# Patient Record
Sex: MALE | Race: WHITE | NOT HISPANIC OR LATINO | Employment: OTHER | URBAN - METROPOLITAN AREA
[De-identification: names, ages, dates, MRNs, and addresses within clinical notes are randomized per-mention and may not be internally consistent; named-entity substitution may affect disease eponyms.]

---

## 2017-03-20 ENCOUNTER — OFFICE VISIT (OUTPATIENT)
Dept: FAMILY MEDICINE | Facility: CLINIC | Age: 82
End: 2017-03-20
Payer: COMMERCIAL

## 2017-03-20 VITALS
WEIGHT: 191 LBS | TEMPERATURE: 97.3 F | DIASTOLIC BLOOD PRESSURE: 71 MMHG | SYSTOLIC BLOOD PRESSURE: 130 MMHG | RESPIRATION RATE: 18 BRPM | BODY MASS INDEX: 28.95 KG/M2 | HEIGHT: 68 IN

## 2017-03-20 DIAGNOSIS — E78.2 MIXED HYPERLIPIDEMIA: ICD-10-CM

## 2017-03-20 DIAGNOSIS — I10 HYPERTENSION GOAL BP (BLOOD PRESSURE) < 140/90: Primary | ICD-10-CM

## 2017-03-20 DIAGNOSIS — Z00.00 ROUTINE HISTORY AND PHYSICAL EXAMINATION OF ADULT: ICD-10-CM

## 2017-03-20 LAB
ANION GAP SERPL CALCULATED.3IONS-SCNC: 9 MMOL/L (ref 3–14)
BUN SERPL-MCNC: 12 MG/DL (ref 7–30)
CALCIUM SERPL-MCNC: 9.2 MG/DL (ref 8.5–10.1)
CHLORIDE SERPL-SCNC: 100 MMOL/L (ref 94–109)
CHOLEST SERPL-MCNC: 161 MG/DL
CO2 SERPL-SCNC: 27 MMOL/L (ref 20–32)
CREAT SERPL-MCNC: 1.32 MG/DL (ref 0.66–1.25)
GFR SERPL CREATININE-BSD FRML MDRD: 51 ML/MIN/1.7M2
GLUCOSE SERPL-MCNC: 105 MG/DL (ref 70–99)
HDLC SERPL-MCNC: 59 MG/DL
LDLC SERPL CALC-MCNC: 77 MG/DL
NONHDLC SERPL-MCNC: 102 MG/DL
POTASSIUM SERPL-SCNC: 3.8 MMOL/L (ref 3.4–5.3)
SODIUM SERPL-SCNC: 136 MMOL/L (ref 133–144)
TRIGL SERPL-MCNC: 124 MG/DL

## 2017-03-20 PROCEDURE — 80048 BASIC METABOLIC PNL TOTAL CA: CPT | Performed by: FAMILY MEDICINE

## 2017-03-20 PROCEDURE — 80061 LIPID PANEL: CPT | Performed by: FAMILY MEDICINE

## 2017-03-20 PROCEDURE — 36415 COLL VENOUS BLD VENIPUNCTURE: CPT | Performed by: FAMILY MEDICINE

## 2017-03-20 PROCEDURE — G0438 PPPS, INITIAL VISIT: HCPCS | Performed by: FAMILY MEDICINE

## 2017-03-20 RX ORDER — SIMVASTATIN 20 MG
20 TABLET ORAL AT BEDTIME
Qty: 90 TABLET | Refills: 3 | Status: SHIPPED | OUTPATIENT
Start: 2017-03-20 | End: 2018-03-23

## 2017-03-20 RX ORDER — LISINOPRIL AND HYDROCHLOROTHIAZIDE 12.5; 2 MG/1; MG/1
1 TABLET ORAL DAILY
Qty: 90 TABLET | Refills: 3 | Status: SHIPPED | OUTPATIENT
Start: 2017-03-20 | End: 2018-03-23

## 2017-03-20 NOTE — NURSING NOTE
"Chief Complaint   Patient presents with     Physical       Initial /71  Temp 97.3  F (36.3  C)  Resp 18  Ht 5' 8\" (1.727 m)  Wt 191 lb (86.6 kg)  BMI 29.04 kg/m2 Estimated body mass index is 29.04 kg/(m^2) as calculated from the following:    Height as of this encounter: 5' 8\" (1.727 m).    Weight as of this encounter: 191 lb (86.6 kg).  Medication Reconciliation: complete   Sayra Millard CMA      "

## 2017-03-20 NOTE — PROGRESS NOTES
SUBJECTIVE:                                                            Shawn Carty is a 89 year old male who presents for Preventive Visit.      Are you in the first 12 months of your Medicare Part B coverage?  No    Healthy Habits:    Do you get at least three servings of calcium containing foods daily (dairy, green leafy vegetables, etc.)? yes    Amount of exercise or daily activities, outside of work: 7 day(s) per week    Problems taking medications regularly No    Medication side effects: No    Have you had an eye exam in the past two years? no    Do you see a dentist twice per year? yes    Do you have sleep apnea, excessive snoring or daytime drowsiness?no    COGNITIVE SCREEN  1) Repeat 3 items (Banana, Sunrise, Chair)    2) Clock draw: NORMAL  3) 3 item recall: Recalls 3 objects  Results: 3 items recalled: COGNITIVE IMPAIRMENT LESS LIKELY    Mini-CogTM Copyright S Preeti. Licensed by the author for use in [facility]; reprinted with permission (suzi@[facility]). All rights reserved.                Reviewed and updated as needed this visit by clinical staff  Tobacco  Allergies  Meds         Reviewed and updated as needed this visit by Provider        Social History   Substance Use Topics     Smoking status: Former Smoker     Smokeless tobacco: Never Used     Alcohol use No       The patient does not drink >3 drinks per day nor >7 drinks per week.    Today's PHQ-2 Score:   PHQ-2 ( 1999 Pfizer) 3/20/2017 3/2/2016   Q1: Little interest or pleasure in doing things 0 0   Q2: Feeling down, depressed or hopeless 0 0   PHQ-2 Score 0 0       Do you feel safe in your environment - Yes    Do you have a Health Care Directive?: Yes: Advance Directive has been received and scanned.    Current providers sharing in care for this patient include:   Patient Care Team:  Cesar Manuel MD as PCP - General (Family Practice)      Hearing impairment: No    Ability to successfully perform activities of daily  "living: Yes, no assistance needed     Fall risk:  Fallen 2 or more times in the past year?: No  Any fall with injury in the past year?: No    Home safety:  none identified      The following health maintenance items are reviewed in Epic and correct as of today:  Health Maintenance   Topic Date Due     TETANUS IMMUNIZATION (SYSTEM ASSIGNED)  03/05/1946     ADVANCE DIRECTIVE PLANNING Q5 YRS (NO INBASKET)  03/05/1946     PNEUMOCOCCAL (1 of 2 - PCV13) 03/05/1993     FALL RISK ASSESSMENT  03/02/2017     INFLUENZA VACCINE (SYSTEM ASSIGNED)  09/01/2017              ROS:  Constitutional, HEENT, cardiovascular, pulmonary, GI, , musculoskeletal, neuro, skin, endocrine and psych systems are negative, except as otherwise noted.    Problem list, Medication list, Allergies, and Medical/Social/Surgical histories reviewed in [facility] and updated as appropriate.  OBJECTIVE:                                                            /71  Temp 97.3  F (36.3  C)  Resp 18  Ht 5' 8\" (1.727 m)  Wt 191 lb (86.6 kg)  BMI 29.04 kg/m2 Estimated body mass index is 29.04 kg/(m^2) as calculated from the following:    Height as of this encounter: 5' 8\" (1.727 m).    Weight as of this encounter: 191 lb (86.6 kg).  EXAM:   GENERAL: healthy, alert and no distress  EYES: Eyes grossly normal to inspection, PERRL and conjunctivae and sclerae normal  HENT: ear canals and TM's normal, nose and mouth without ulcers or lesions  NECK: no adenopathy, no asymmetry, masses, or scars and thyroid normal to palpation  RESP: lungs clear to auscultation - no rales, rhonchi or wheezes  CV: regular rate and rhythm, normal S1 S2, no S3 or S4, no murmur, click or rub, no peripheral edema and peripheral pulses strong  ABDOMEN: soft, nontender, no hepatosplenomegaly, no masses and bowel sounds normal  MS: no gross musculoskeletal defects noted, no edema  SKIN: no suspicious lesions or rashes  NEURO: Normal strength and tone, mentation intact and speech normal  PSYCH: " "mentation appears normal, affect normal/bright    ASSESSMENT / PLAN:                                                            Shawn was seen today for physical.    Diagnoses and all orders for this visit:    Hypertension goal BP (blood pressure) < 140/90  -     Basic metabolic panel    Mixed hyperlipidemia  -     Lipid panel reflex to direct LDL  -     lisinopril-hydrochlorothiazide (PRINZIDE/ZESTORETIC) 20-12.5 MG per tablet; Take 1 tablet by mouth daily  -     simvastatin (ZOCOR) 20 MG tablet; Take 1 tablet (20 mg) by mouth At Bedtime    Routine history and physical examination of adult        End of Life Planning:  Patient currently has an advanced directive:     COUNSELING:  Reviewed preventive health counseling, as reflected in patient instructions       Regular exercise       Healthy diet/nutrition        Estimated body mass index is 29.04 kg/(m^2) as calculated from the following:    Height as of this encounter: 5' 8\" (1.727 m).    Weight as of this encounter: 191 lb (86.6 kg).     reports that he has quit smoking. He has never used smokeless tobacco.      Appropriate preventive services were discussed with this patient, including applicable screening as appropriate for cardiovascular disease, diabetes, osteopenia/osteoporosis, and glaucoma.  As appropriate for age/gender, discussed screening for colorectal cancer, prostate cancer, breast cancer, and cervical cancer. Checklist reviewing preventive services available has been given to the patient.    Reviewed patients plan of care and provided an AVS. The Basic Care Plan (routine screening as documented in Health Maintenance) for Shawn meets the Care Plan requirement. This Care Plan has been established and reviewed with the Patient.    Counseling Resources:  ATP IV Guidelines  Pooled Cohorts Equation Calculator  Breast Cancer Risk Calculator  FRAX Risk Assessment  ICSI Preventive Guidelines  Dietary Guidelines for Americans, 2010  USDA's MyPlate  ASA " Prophylaxis  Lung CA Screening    Cesar Manuel MD  [facility]

## 2017-03-20 NOTE — LETTER
[facility]  [address]  [place] [address]  Phone: [phone]      3/20/2017     Shawn Carty  [address]    [place] [address]      Dear Shawn:    Thank you for allowing me to participate in your care. Your recent test results were reviewed and listed below.      Your results are provided below for your review  Results for orders placed or performed in visit on 03/20/17   Basic metabolic panel   Result Value Ref Range    Sodium 136 133 - 144 mmol/L    Potassium 3.8 3.4 - 5.3 mmol/L    Chloride 100 94 - 109 mmol/L    Carbon Dioxide 27 20 - 32 mmol/L    Anion Gap 9 3 - 14 mmol/L    Glucose 105 (H) 70 - 99 mg/dL    Urea Nitrogen 12 7 - 30 mg/dL    Creatinine 1.32 (H) 0.66 - 1.25 mg/dL    GFR Estimate 51 (L) >60 mL/min/1.7m2    GFR Estimate If Black 62 >60 mL/min/1.7m2    Calcium 9.2 8.5 - 10.1 mg/dL   Lipid panel reflex to direct LDL   Result Value Ref Range    Cholesterol 161 <200 mg/dL    Triglycerides 124 <150 mg/dL    HDL Cholesterol 59 >39 mg/dL    LDL Cholesterol Calculated 77 <100 mg/dL    Non HDL Cholesterol 102 <130 mg/dL               inform patient tests are acceptable                             Thank you for choosing [place]. As a result, please continue with the treatment plan discussed in the office. Return as discussed or sooner if symptoms worsen or fail to improve. If you have any further questions or concerns, please do not hesitate to contact us.      Sincerely,        Dr. Cesar Manuel/Sayra Millard, CMA

## 2017-03-20 NOTE — MR AVS SNAPSHOT
After Visit Summary   3/20/2017    Shawn Carty    MRN: [id number]           Patient Information     Date Of Birth          3/5/1928        Visit Information        Provider Department      3/20/2017 9:40 AM Cesar Manuel MD [facility]        Today's Diagnoses     Hypertension goal BP (blood pressure) < 140/90    -  1    Mixed hyperlipidemia        Routine history and physical examination of adult          Care Instructions      Preventive Health Recommendations:       Male Ages 65 and over    Yearly exam:             See your health care provider every year in order to  o   Review health changes.   o   Discuss preventive care.    o   Review your medicines if your doctor has prescribed any.    Talk with your health care provider about whether you should have a test to screen for prostate cancer (PSA).    Every 3 years, have a diabetes test (fasting glucose). If you are at risk for diabetes, you should have this test more often.    Every 5 years, have a cholesterol test. Have this test more often if you are at risk for high cholesterol or heart disease.     Every 10 years, have a colonoscopy. Or, have a yearly FIT test (stool test). These exams will check for colon cancer.    Talk to with your health care provider about screening for Abdominal Aortic Aneurysm if you have a family history of AAA or have a history of smoking.  Shots:     Get a flu shot each year.     Get a tetanus shot every 10 years.     Talk to your doctor about your pneumonia vaccines. There are now two you should receive - Pneumovax (PPSV 23) and Prevnar (PCV 13).    Talk to your doctor about a shingles vaccine.     Talk to your doctor about the hepatitis B vaccine.  Nutrition:     Eat at least 5 servings of fruits and vegetables each day.     Eat whole-grain bread, whole-wheat pasta and brown rice instead of white grains and rice.     Talk to your doctor about Calcium and Vitamin D.   Lifestyle    Exercise  "for at least 150 minutes a week (30 minutes a day, 5 days a week). This will help you control your weight and prevent disease.     Limit alcohol to one drink per day.     No smoking.     Wear sunscreen to prevent skin cancer.     See your dentist every six months for an exam and cleaning.     See your eye doctor every 1 to 2 years to screen for conditions such as glaucoma, macular degeneration and cataracts.        Follow-ups after your visit        Who to contact     If you have questions or need follow up information about today's clinic visit or your schedule please contact [facility] 2[facility] directly at [phone].  Normal or non-critical lab and imaging results will be communicated to you by Zeohart, letter or phone within 4 business days after the clinic has received the results. If you do not hear from us within 7 days, please contact the clinic through One2startt or phone. If you have a critical or abnormal lab result, we will notify you by phone as soon as possible.  Submit refill requests through [email or web address] or call your pharmacy and they will forward the refill request to us. Please allow 3 business days for your refill to be completed.          Additional Information About Your Visit        ZeoharAOBiome Information     [email or web address] lets you send messages to your doctor, view your test results, renew your prescriptions, schedule appointments and more. To sign up, go to [email or web address] . Click on \"Log in\" on the left side of the screen, which will take you to the Welcome page. Then click on \"Sign up Now\" on the right side of the page.     You will be asked to enter the access code listed below, as well as some personal information. Please follow the directions to create your username and password.     Your access code is: [email or web address]  Expires: 2017  9:54 AM     Your access code will  in 90 days. If you need help or a new code, please call your [facility] or [phone].        Care " "EveryWhere ID     This is your Care EveryWhere ID. This could be used by other organizations to access your [place] medical records  [id number]        Your Vitals Were     Temperature Respirations Height BMI (Body Mass Index)          97.3  F (36.3  C) 18 5' 8\" (1.727 m) 29.04 kg/m2         Blood Pressure from Last 3 Encounters:   03/20/17 130/71   11/21/16 144/76   03/02/16 138/85    Weight from Last 3 Encounters:   03/20/17 191 lb (86.6 kg)   11/21/16 195 lb 9.6 oz (88.7 kg)   03/02/16 183 lb (83 kg)              We Performed the Following     Basic metabolic panel     Lipid panel reflex to direct LDL          Today's Medication Changes          These changes are accurate as of: 3/20/17 10:19 AM.  If you have any questions, ask your nurse or doctor.               These medicines have changed or have updated prescriptions.        Dose/Directions    lisinopril-hydrochlorothiazide 20-12.5 MG per tablet   Commonly known as:  PRINZIDE/ZESTORETIC   This may have changed:  additional instructions   Used for:  Mixed hyperlipidemia   Changed by:  Cesar Manuel MD        Dose:  1 tablet   Take 1 tablet by mouth daily   Quantity:  90 tablet   Refills:  3       simvastatin 20 MG tablet   Commonly known as:  ZOCOR   This may have changed:  additional instructions   Used for:  Mixed hyperlipidemia   Changed by:  Cesar Manuel MD        Dose:  20 mg   Take 1 tablet (20 mg) by mouth At Bedtime   Quantity:  90 tablet   Refills:  3            Where to get your medicines      These medications were sent to [facility] [address]  [address], [facility] [place] [facility] [address]     Phone:  [phone]     lisinopril-hydrochlorothiazide 20-12.5 MG per tablet    simvastatin 20 MG tablet                Primary Care Provider Office Phone # Fax #    Cesar Manuel [phone]       [facility] [address] [address] [facility] [address]        Thank you!     Thank you " for choosing [facility]  for your care. Our goal is always to provide you with excellent care. Hearing back from our patients is one way we can continue to improve our services. Please take a few minutes to complete the written survey that you may receive in the mail after your visit with us. Thank you!             Your Updated Medication List - Protect others around you: Learn how to safely use, store and throw away your medicines at www.disposemymeds.org.          This list is accurate as of: 3/20/17 10:19 AM.  Always use your most recent med list.                   Brand Name Dispense Instructions for use    ALLERGY 4 HOUR PO          calcium carbonate 500 MG tablet    OS-OBED 500 mg [place]. [place]     Take 500 mg by mouth 2 times daily       cetirizine 10 MG tablet    zyrTEC     Take 10 mg by mouth daily       lisinopril-hydrochlorothiazide 20-12.5 MG per tablet    PRINZIDE/ZESTORETIC    90 tablet    Take 1 tablet by mouth daily       multivitamin, therapeutic Tabs tablet      Take 1 tablet by mouth daily       simvastatin 20 MG tablet    ZOCOR    90 tablet    Take 1 tablet (20 mg) by mouth At Bedtime       VITAMIN D3 PO      Take by mouth daily

## 2017-05-24 ENCOUNTER — OFFICE VISIT (OUTPATIENT)
Dept: FAMILY MEDICINE | Facility: CLINIC | Age: 82
End: 2017-05-24
Payer: COMMERCIAL

## 2017-05-24 VITALS
HEART RATE: 77 BPM | HEIGHT: 68 IN | DIASTOLIC BLOOD PRESSURE: 78 MMHG | TEMPERATURE: 98.7 F | WEIGHT: 197 LBS | SYSTOLIC BLOOD PRESSURE: 130 MMHG | BODY MASS INDEX: 29.86 KG/M2

## 2017-05-24 DIAGNOSIS — M54.5 LOW BACK PAIN, UNSPECIFIED BACK PAIN LATERALITY, UNSPECIFIED CHRONICITY, WITH SCIATICA PRESENCE UNSPECIFIED: Primary | ICD-10-CM

## 2017-05-24 PROCEDURE — 99213 OFFICE O/P EST LOW 20 MIN: CPT | Performed by: FAMILY MEDICINE

## 2017-05-24 NOTE — PROGRESS NOTES
SUBJECTIVE:                                                    Shawn Carty is a 89 year old male who presents to clinic today for the following health issues:    Back Pain      Duration: 2-3 weeks, started with hard to stand up, ache in lower back, radiating into calfs in both legs. Tylenol is helping. Feels as though he will fall with certain movement.         Specific cause: none    Description:   Location of pain: low back bilateral  Character of pain: sharp with certain movement  and dull ache  Pain radiation:radiates into the right leg and radiates into the left leg  New numbness or weakness in legs, not attributed to pain:  no     Intensity: At its worst 5/10    History:   History of back problems: recurrent self limited episodes of low back pain in the past, seen chiropractor.   Any previous MRI or X-rays: Yes  Sees a specialist for back pain:    Therapies tried without relief: back exercise    Alleviating factors:   Improved by: acetaminophen (Tylenol)      Precipitating factors:  Worsened by: certain movement.     Functional and Psychosocial Screen (Liat STarT Back):      Not performed today       Accompanying Signs & Symptoms:  Risk of Fracture:    Risk of Cauda Equina:    Risk of Infection:    Risk of Cancer:    Risk of Ankylosing Spondylitis:  Onset at age <35, male, AND morning back stiffness.                          Problem list and histories reviewed & adjusted, as indicated.  Additional history:         Reviewed and updated as needed this visit by clinical staff  Tobacco  Allergies  Meds  Med Hx  Surg Hx  Fam Hx  Soc Hx      Reviewed and updated as needed this visit by Provider       Further history obtained, clarified or corrected by physician:  He's had lower back pain that radiates into the buttocks bilaterally over the last couple weeks. He doesn't know of any incident that set this off. He had an episode of back pain almost 20 years ago that was treated with acupuncture and he  has not had any problems before or since until now.  ./o  LUNGS: clear to auscultation, normal breath sounds  CV: RRR without murmur  ABD: BS+, soft, nontender, no masses, no hepatosplenomegaly  BACK: He has decreased range of motion of his lower back and there is some discomfort with extension. Straight leg raising is negative. There is no tenderness or spasm noted.  NEURO: Alert and oriented X 3, non focal exam, DTRs normal, motor and sensation normal, coordination and gait without abnormality.    ASSESSMENT:  Low back pain, unspecified back pain laterality, unspecified chronicity, with sciatica presence unspecified    PLAN:  Trial of anti-inflammatory  Stretching exercises  Return for worsening or persisting problems.    Orders Placed This Encounter     naproxen (NAPROSYN) 375 MG tablet

## 2017-05-24 NOTE — NURSING NOTE
"Chief Complaint   Patient presents with     Back Pain       Initial /78  Pulse 77  Temp 98.7  F (37.1  C) (Tympanic)  Ht 5' 8\" (1.727 m)  Wt 197 lb (89.4 kg)  BMI 29.95 kg/m2 Estimated body mass index is 29.95 kg/(m^2) as calculated from the following:    Height as of this encounter: 5' 8\" (1.727 m).    Weight as of this encounter: 197 lb (89.4 kg).  Medication Reconciliation: complete   Stacie Garcias CMA    "

## 2017-05-24 NOTE — MR AVS SNAPSHOT
"              After Visit Summary   2017    Shawn Carty    MRN: [id number]           Patient Information     Date Of Birth          3/5/1928        Visit Information        Provider Department      2017 1:00 PM C[facility]        Today's Diagnoses     Low back pain, unspecified back pain laterality, unspecified chronicity, with sciatica presence unspecified    -  1       Follow-ups after your visit        Who to contact     If you have questions or need follow up information about today's clinic visit or your schedule please contact [facility] directly at [phone].  Normal or non-critical lab and imaging results will be communicated to you by MyChart, letter or phone within 4 business days after the clinic has received the results. If you do not hear from us within 7 days, please contact the clinic through MyChart or phone. If you have a critical or abnormal lab result, we will notify you by phone as soon as possible.  Submit refill requests through CodeStreet or call your pharmacy and they will forward the refill request to us. Please allow 3 business days for your refill to be completed.          Additional Information About Your Visit        MyChart Information     CodeStreet lets you send messages to your doctor, view your test results, renew your prescriptions, schedule appointments and more. To sign up, go to [email or web address] . Click on \"Log in\" on the left side of the screen, which will take you to the Welcome page. Then click on \"Sign up Now\" on the right side of the page.     You will be asked to enter the access code listed below, as well as some personal information. Please follow the directions to create your username and password.     Your access code is: [email or web address]  Expires: 2017  9:54 AM     Your access code will  in 90 days. If you need help or a new code, please call your [facility] or [phone].      " "  Care EveryWhere ID     This is your Care EveryWhere ID. This could be used by other organizations to access your [place] medical records  [id number]        Your Vitals Were     Pulse Temperature Height BMI (Body Mass Index)          77 98.7  F (37.1  C) (Tympanic) 5' 8\" (1.727 m) 29.95 kg/m2         Blood Pressure from Last 3 Encounters:   05/24/17 130/78   03/20/17 130/71   11/21/16 144/76    Weight from Last 3 Encounters:   05/24/17 197 lb (89.4 kg)   03/20/17 191 lb (86.6 kg)   11/21/16 195 lb 9.6 oz (88.7 kg)              Today, you had the following     No orders found for display         Today's Medication Changes          These changes are accurate as of: 5/24/17  1:18 PM.  If you have any questions, ask your nurse or doctor.               Start taking these medicines.        Dose/Directions    naproxen 375 MG tablet   Commonly known as:  NAPROSYN   Used for:  Low back pain, unspecified back pain laterality, unspecified chronicity, with sciatica presence unspecified   Started by:  Cesar Manuel MD        Dose:  375 mg   Take 1 tablet (375 mg) by mouth 2 times daily (with meals)   Quantity:  30 tablet   Refills:  3            Where to get your medicines      These medications were sent to [facility] [address]  [address] [facility] [address]     Phone:  [phone]     naproxen 375 MG tablet                Primary Care Provider Office Phone # Fax #    Cesar Manuel [phone]       [facility] [address] [facility] [address]        Thank you!     Thank you for choosing [facility]  for your care. Our goal is always to provide you with excellent care. Hearing back from our patients is one way we can continue to improve our services. Please take a few minutes to complete the written survey that you may receive in the mail after your visit with us. Thank you!             Your " Updated Medication List - Protect others around you: Learn how to safely use, store and throw away your medicines at www.disposemymeds.org.          This list is accurate as of: 5/24/17  1:18 PM.  Always use your most recent med list.                   Brand Name Dispense Instructions for use    ALLERGY 4 HOUR PO          cetirizine 10 MG tablet    zyrTEC     Take 10 mg by mouth daily       lisinopril-hydrochlorothiazide 20-12.5 MG per tablet    PRINZIDE/ZESTORETIC    90 tablet    Take 1 tablet by mouth daily       multivitamin, therapeutic Tabs tablet      Take 1 tablet by mouth daily       naproxen 375 MG tablet    NAPROSYN    30 tablet    Take 1 tablet (375 mg) by mouth 2 times daily (with meals)       simvastatin 20 MG tablet    ZOCOR    90 tablet    Take 1 tablet (20 mg) by mouth At Bedtime

## 2017-06-01 ENCOUNTER — TELEPHONE (OUTPATIENT)
Dept: FAMILY MEDICINE | Facility: CLINIC | Age: 82
End: 2017-06-01

## 2017-06-01 NOTE — TELEPHONE ENCOUNTER
Reason for Call:  Other med question    Detailed comments: Patient would like to talk to a nurse about his Naproxen.    Phone Number Patient can be reached at: Home number on file [phone] (home)    Best Time: any    Can we leave a detailed message on this number? YES    Call taken on 6/1/2017 at 9:45 AM by Joselin Duque

## 2017-06-01 NOTE — TELEPHONE ENCOUNTER
Patient called to report the naproxen is helping.  Patient was advised to continue taking as prescribed and we will contact him there are any changes.    Patricia SHEPPARD RN

## 2017-09-06 ENCOUNTER — OFFICE VISIT (OUTPATIENT)
Dept: FAMILY MEDICINE | Facility: CLINIC | Age: 82
End: 2017-09-06
Payer: COMMERCIAL

## 2017-09-06 VITALS
SYSTOLIC BLOOD PRESSURE: 134 MMHG | TEMPERATURE: 98.2 F | DIASTOLIC BLOOD PRESSURE: 72 MMHG | HEIGHT: 68 IN | BODY MASS INDEX: 30.16 KG/M2 | HEART RATE: 92 BPM | OXYGEN SATURATION: 94 % | WEIGHT: 199 LBS

## 2017-09-06 DIAGNOSIS — T78.40XA ALLERGIC STATE, INITIAL ENCOUNTER: Primary | ICD-10-CM

## 2017-09-06 PROCEDURE — 99213 OFFICE O/P EST LOW 20 MIN: CPT | Performed by: FAMILY MEDICINE

## 2017-09-06 RX ORDER — DIPHENHYDRAMINE HCL 25 MG
TABLET ORAL
COMMUNITY
Start: 2017-09-06 | End: 2019-04-02

## 2017-09-06 RX ORDER — FLUTICASONE PROPIONATE 50 MCG
2 SPRAY, SUSPENSION (ML) NASAL DAILY
Qty: 1 BOTTLE | Refills: 1 | Status: SHIPPED | OUTPATIENT
Start: 2017-09-06 | End: 2017-11-13

## 2017-09-06 NOTE — NURSING NOTE
"Chief Complaint   Patient presents with     Allergies     Here to discuss about worsening allergies.       Initial /72  Pulse 92  Temp 98.2  F (36.8  C) (Oral)  Ht 5' 8\" (1.727 m)  Wt 199 lb (90.3 kg)  SpO2 94%  BMI 30.26 kg/m2 Estimated body mass index is 30.26 kg/(m^2) as calculated from the following:    Height as of this encounter: 5' 8\" (1.727 m).    Weight as of this encounter: 199 lb (90.3 kg).  Medication Reconciliation: complete  "

## 2017-09-06 NOTE — MR AVS SNAPSHOT
"              After Visit Summary   2017    Shawn Carty    MRN: [id number]           Patient Information     Date Of Birth          3/5/1928        Visit Information        Provider Department      2017 10:20 AM Cesar Manuel MD [facility]        Today's Diagnoses     Allergic state, initial encounter    -  1       Follow-ups after your visit        Who to contact     If you have questions or need follow up information about today's clinic visit or your schedule please contact [facility] directly at [phone].  Normal or non-critical lab and imaging results will be communicated to you by indicohart, letter or phone within 4 business days after the clinic has received the results. If you do not hear from us within 7 days, please contact the clinic through indicohart or phone. If you have a critical or abnormal lab result, we will notify you by phone as soon as possible.  Submit refill requests through [employer] or call your pharmacy and they will forward the refill request to us. Please allow 3 business days for your refill to be completed.          Additional Information About Your Visit        MyChart Information     [employer] lets you send messages to your doctor, view your test results, renew your prescriptions, schedule appointments and more. To sign up, go to [email or web address] . Click on \"Log in\" on the left side of the screen, which will take you to the Welcome page. Then click on \"Sign up Now\" on the right side of the page.     You will be asked to enter the access code listed below, as well as some personal information. Please follow the directions to create your username and password.     Your access code is: [email or web address]  Expires: 2017 10:51 AM     Your access code will  in 90 days. If you need help or a new code, please call your [facility] or [phone].        Care EveryWhere ID     This is your Care EveryWhere ID. This could be " "used by other organizations to access your [place] medical records  [id number]        Your Vitals Were     Pulse Temperature Height Pulse Oximetry BMI (Body Mass Index)       92 98.2  F (36.8  C) (Oral) 5' 8\" (1.727 m) 94% 30.26 kg/m2        Blood Pressure from Last 3 Encounters:   09/06/17 134/72   05/24/17 130/78   03/20/17 130/71    Weight from Last 3 Encounters:   09/06/17 199 lb (90.3 kg)   05/24/17 197 lb (89.4 kg)   03/20/17 191 lb (86.6 kg)              Today, you had the following     No orders found for display         Today's Medication Changes          These changes are accurate as of: 9/6/17 10:51 AM.  If you have any questions, ask your nurse or doctor.               Start taking these medicines.        Dose/Directions    fluticasone 50 MCG/ACT spray   Commonly known as:  FLONASE   Used for:  Allergic state, initial encounter   Started by:  Cesar Manuel MD        Dose:  2 spray   Spray 2 sprays into both nostrils daily   Quantity:  1 Bottle   Refills:  1            Where to get your medicines      These medications were sent to [facility] [place] [address]  [address] [facility] [address]     Phone:  [phone]     fluticasone 50 MCG/ACT spray                Primary Care Provider Office Phone # Fax #    Cesar Manuel [phone]       [address] [facility] [address]        Equal Access to Services     [facility] AH: Hadii rafael gipson hadasho Soomaali, waaxda luqadaha, qaybta kaalmada adeegyada, tom brown. So [facility] [phone].    ATENCIÓN: Si habla español, tiene a telles disposición servicios gratuitos de asistencia lingüística. Llame al [phone].    We comply with applicable federal civil rights laws and [place] laws. We do not discriminate on the basis of race, color, national origin, age, disability sex, sexual orientation or gender identity.            Thank you!     " Thank you for choosing [facility]  for your care. Our goal is always to provide you with excellent care. Hearing back from our patients is one way we can continue to improve our services. Please take a few minutes to complete the written survey that you may receive in the mail after your visit with us. Thank you!             Your Updated Medication List - Protect others around you: Learn how to safely use, store and throw away your medicines at www.disposemymeds.org.          This list is accurate as of: 9/6/17 10:51 AM.  Always use your most recent med list.                   Brand Name Dispense Instructions for use Diagnosis    ALLERGY 4 HOUR PO           BENADRYL 25 MG tablet   Generic drug:  diphenhydrAMINE      Taking as needed for allergies.    Allergic state, initial encounter       cetirizine 10 MG tablet    zyrTEC     Take 10 mg by mouth daily        fluticasone 50 MCG/ACT spray    FLONASE    1 Bottle    Spray 2 sprays into both nostrils daily    Allergic state, initial encounter       lisinopril-hydrochlorothiazide 20-12.5 MG per tablet    PRINZIDE/ZESTORETIC    90 tablet    Take 1 tablet by mouth daily    Mixed hyperlipidemia       multivitamin, therapeutic Tabs tablet      Take 1 tablet by mouth daily        naproxen 375 MG tablet    NAPROSYN    30 tablet    Take 1 tablet (375 mg) by mouth 2 times daily (with meals)    Low back pain, unspecified back pain laterality, unspecified chronicity, with sciatica presence unspecified       simvastatin 20 MG tablet    ZOCOR    90 tablet    Take 1 tablet (20 mg) by mouth At Bedtime    Mixed hyperlipidemia

## 2017-09-06 NOTE — PROGRESS NOTES
"  SUBJECTIVE:   Shawn Carty is a 89 year old male who presents to clinic today for the following health issues:      ALLERGIES      Duration: Started on Friday for worsening symptoms.    Description:   Nasal congestion: YES  Sneezing: YES- More than normal.  Red, itchy eyes: no    Accompanying signs and symptoms: Watery eyes for about one day.  Has a cough at times, yellow-green phlegm.  No fever or chills.    History (similar episodes/allergy testing): States his Zyrtec has usually been helping his allergies.  Currently symptoms are worse.    Precipitating or alleviating factors: None    Therapies tried and outcome: Has added some Benadryl, 2 tablets every 4 hours along with the Zyrtec.  Wanting to discuss about another medication he could try.             Problem list and histories reviewed & adjusted, as indicated.  Additional history:         Reviewed and updated as needed this visit by clinical staff     Reviewed and updated as needed this visit by Provider      Further history obtained, clarified or corrected by physician:  Mostly rhinorrhea but also some watering eyes. This was being controlled with Zyrtec only then he added Benadryl when symptoms worsened and now the 2 together are not controlling his symptoms adequately.    OBJECTIVE:  HEAD: AT/NC  EYES: PERRLA, EOMI, Sclerae clear, significant tearing, Fundi normal with sharp discs  EARS: TMs clear, canals normal  NOSE & THROAT: clear  /72  Pulse 92  Temp 98.2  F (36.8  C) (Oral)  Ht 5' 8\" (1.727 m)  Wt 199 lb (90.3 kg)  SpO2 94%  BMI 30.26 kg/m2  LUNGS: clear to auscultation, normal breath sounds  CV: RRR without murmur  ABD: BS+, soft, nontender, no masses, no hepatosplenomegaly    ASSESSMENT:  Allergic state, initial encounter    PLAN:  Orders Placed This Encounter     diphenhydrAMINE (BENADRYL) 25 MG tablet     fluticasone (FLONASE) 50 MCG/ACT spray         "

## 2017-11-13 DIAGNOSIS — T78.40XA ALLERGIC STATE, INITIAL ENCOUNTER: ICD-10-CM

## 2017-11-13 NOTE — TELEPHONE ENCOUNTER
Fluticasone      Last Written Prescription Date: 09/06/2017  Last Fill Quantity: 1,  # refills: 1   Last Office Visit with [facility], [facility] or [facility] prescribing provider: 09/06/2017      Boy STEWART)

## 2017-11-14 RX ORDER — FLUTICASONE PROPIONATE 50 MCG
SPRAY, SUSPENSION (ML) NASAL
Qty: 16 G | Refills: 1 | Status: SHIPPED | OUTPATIENT
Start: 2017-11-14 | End: 2018-01-26

## 2018-01-26 DIAGNOSIS — T78.40XA ALLERGIC STATE, INITIAL ENCOUNTER: ICD-10-CM

## 2018-01-30 RX ORDER — FLUTICASONE PROPIONATE 50 MCG
SPRAY, SUSPENSION (ML) NASAL
Qty: 16 G | Refills: 1 | Status: SHIPPED | OUTPATIENT
Start: 2018-01-30 | End: 2018-04-13

## 2018-03-23 ENCOUNTER — OFFICE VISIT (OUTPATIENT)
Dept: FAMILY MEDICINE | Facility: CLINIC | Age: 83
End: 2018-03-23
Payer: COMMERCIAL

## 2018-03-23 VITALS
BODY MASS INDEX: 29.4 KG/M2 | WEIGHT: 194 LBS | RESPIRATION RATE: 18 BRPM | HEIGHT: 68 IN | OXYGEN SATURATION: 100 % | SYSTOLIC BLOOD PRESSURE: 142 MMHG | DIASTOLIC BLOOD PRESSURE: 80 MMHG | TEMPERATURE: 98.3 F | HEART RATE: 78 BPM

## 2018-03-23 DIAGNOSIS — Z00.00 ROUTINE GENERAL MEDICAL EXAMINATION AT A HEALTH CARE FACILITY: Primary | ICD-10-CM

## 2018-03-23 DIAGNOSIS — E78.2 MIXED HYPERLIPIDEMIA: ICD-10-CM

## 2018-03-23 LAB
ANION GAP SERPL CALCULATED.3IONS-SCNC: 8 MMOL/L (ref 3–14)
BUN SERPL-MCNC: 18 MG/DL (ref 7–30)
CALCIUM SERPL-MCNC: 9 MG/DL (ref 8.5–10.1)
CHLORIDE SERPL-SCNC: 102 MMOL/L (ref 94–109)
CHOLEST SERPL-MCNC: 145 MG/DL
CO2 SERPL-SCNC: 25 MMOL/L (ref 20–32)
CREAT SERPL-MCNC: 1.37 MG/DL (ref 0.66–1.25)
GFR SERPL CREATININE-BSD FRML MDRD: 49 ML/MIN/1.7M2
GLUCOSE SERPL-MCNC: 112 MG/DL (ref 70–99)
HDLC SERPL-MCNC: 52 MG/DL
LDLC SERPL CALC-MCNC: 69 MG/DL
NONHDLC SERPL-MCNC: 93 MG/DL
POTASSIUM SERPL-SCNC: 3.9 MMOL/L (ref 3.4–5.3)
SODIUM SERPL-SCNC: 135 MMOL/L (ref 133–144)
TRIGL SERPL-MCNC: 118 MG/DL

## 2018-03-23 PROCEDURE — 99397 PER PM REEVAL EST PAT 65+ YR: CPT | Performed by: FAMILY MEDICINE

## 2018-03-23 PROCEDURE — 36415 COLL VENOUS BLD VENIPUNCTURE: CPT | Performed by: FAMILY MEDICINE

## 2018-03-23 PROCEDURE — 80061 LIPID PANEL: CPT | Performed by: FAMILY MEDICINE

## 2018-03-23 PROCEDURE — 80048 BASIC METABOLIC PNL TOTAL CA: CPT | Performed by: FAMILY MEDICINE

## 2018-03-23 RX ORDER — SIMVASTATIN 20 MG
20 TABLET ORAL AT BEDTIME
Qty: 90 TABLET | Refills: 3 | Status: SHIPPED | OUTPATIENT
Start: 2018-03-23 | End: 2019-04-02

## 2018-03-23 RX ORDER — LISINOPRIL AND HYDROCHLOROTHIAZIDE 12.5; 2 MG/1; MG/1
1 TABLET ORAL DAILY
Qty: 90 TABLET | Refills: 3 | Status: SHIPPED | OUTPATIENT
Start: 2018-03-23 | End: 2019-04-02

## 2018-03-23 NOTE — PROGRESS NOTES
SUBJECTIVE:   CC: Shawn Carty is an 89 year old male who presents for preventative health visit.     Healthy Habits:    Do you get at least three servings of calcium containing foods daily (dairy, green leafy vegetables, etc.)? yes    Amount of exercise or daily activities, outside of work: 7 day(s) per week    Problems taking medications regularly No    Medication side effects: No    Have you had an eye exam in the past two years? yes    Do you see a dentist twice per year? yes    Do you have sleep apnea, excessive snoring or daytime drowsiness?no       Hyperlipidemia Follow-Up      Rate your low fat/cholesterol diet?: good    Taking statin?  Yes, no muscle aches from statin    Other lipid medications/supplements?:  none    Hypertension Follow-up      Outpatient blood pressures are not being checked.    Low Salt Diet: no added salt      Today's PHQ-2 Score:   PHQ-2 ( 1999 Pf[employer]) 3/23/2018 3/20/2017   Q1: Little interest or pleasure in doing things 0 0   Q2: Feeling down, depressed or hopeless 0 0   PHQ-2 Score 0 0       Abuse: Current or Past(Physical, Sexual or Emotional)- No  Do you feel safe in your environment - Yes    Social History   Substance Use Topics     Smoking status: Former Smoker     Smokeless tobacco: Never Used     Alcohol use No      If you drink alcohol do you typically have >3 drinks per day or >7 drinks per week? No                      Last PSA:   PSA   Date Value Ref Range Status   02/26/2014 3.70 ng/mL Final       Reviewed orders with patient. Reviewed health maintenance and updated orders accordingly - Yes  Labs reviewed in EPIC    Reviewed and updated as needed this visit by clinical staff  Tobacco  Allergies  Meds         Reviewed and updated as needed this visit by Provider            ROS:  C: NEGATIVE for fever, chills, change in weight  I: NEGATIVE for worrisome rashes, moles or lesions  E: NEGATIVE for vision changes or irritation  ENT: NEGATIVE for ear, mouth and throat  "problems  R: NEGATIVE for significant cough or SOB  CV: NEGATIVE for chest pain, palpitations or peripheral edema  GI: NEGATIVE for nausea, abdominal pain, heartburn, or change in bowel habits   male: negative for dysuria, hematuria, decreased urinary stream, erectile dysfunction, urethral discharge  M: NEGATIVE for significant arthralgias or myalgia  N: NEGATIVE for weakness, dizziness or paresthesias  P: NEGATIVE for changes in mood or affect    OBJECTIVE:   /83  Pulse 78  Temp 98.3  F (36.8  C)  Resp 18  Ht 5' 8\" (1.727 m)  Wt 194 lb (88 kg)  SpO2 100%  BMI 29.5 kg/m2  EXAM:  GENERAL: healthy, alert and no distress  EYES: Eyes grossly normal to inspection, PERRL and conjunctivae and sclerae normal  HENT: ear canals and TM's normal, nose and mouth without ulcers or lesions  NECK: no adenopathy, no asymmetry, masses, or scars and thyroid normal to palpation  RESP: lungs clear to auscultation - no rales, rhonchi or wheezes  CV: regular rate and rhythm, normal S1 S2, no S3 or S4, no murmur, click or rub, no peripheral edema and peripheral pulses strong  ABDOMEN: soft, nontender, no hepatosplenomegaly, no masses and bowel sounds normal  MS: no gross musculoskeletal defects noted, no edema  SKIN: no suspicious lesions or rashes  NEURO: Normal strength and tone, mentation intact and speech normal  PSYCH: mentation appears normal, affect normal/bright    ASSESSMENT/PLAN:   Shawn was seen today for physical.    Diagnoses and all orders for this visit:    Routine general medical examination at a health care facility  -     Basic metabolic panel  -     Lipid panel reflex to direct LDL Fasting    Mixed hyperlipidemia  -     lisinopril-hydrochlorothiazide (PRINZIDE/ZESTORETIC) 20-12.5 MG per tablet; Take 1 tablet by mouth daily  -     simvastatin (ZOCOR) 20 MG tablet; Take 1 tablet (20 mg) by mouth At Bedtime        COUNSELING:  Reviewed preventive health counseling, as reflected in patient instructions       " "Consider AAA screening for ages 65-75 and smoking history       Regular exercise       reports that he has quit smoking. He has never used smokeless tobacco.    Estimated body mass index is 29.5 kg/(m^2) as calculated from the following:    Height as of this encounter: 5' 8\" (1.727 m).    Weight as of this encounter: 194 lb (88 kg).       Counseling Resources:  ATP IV Guidelines  Pooled Cohorts Equation Calculator  FRAX Risk Assessment  ICSI Preventive Guidelines  Dietary Guidelines for Americans, 2010  USDA's MyPlate  ASA Prophylaxis  Lung CA Screening    Cesar Manuel MD  [facility]  "

## 2018-03-23 NOTE — MR AVS SNAPSHOT
After Visit Summary   3/23/2018    Shawn Carty    MRN: [id number]           Patient Information     Date Of Birth          3/5/1928        Visit Information        Provider Department      3/23/2018 9:00 AM Cesar Manuel MD [facility]        Today's Diagnoses     Routine general medical examination at a health care facility    -  1    Mixed hyperlipidemia          Care Instructions      Preventive Health Recommendations:   Male Ages 65 and over    Yearly exam:             See your health care provider every year in order to  o   Review health changes.   o   Discuss preventive care.    o   Review your medicines if your doctor has prescribed any.    Talk with your health care provider about whether you should have a test to screen for prostate cancer (PSA).    Every 3 years, have a diabetes test (fasting glucose). If you are at risk for diabetes, you should have this test more often.    Every 5 years, have a cholesterol test. Have this test more often if you are at risk for high cholesterol or heart disease.     Every 10 years, have a colonoscopy. Or, have a yearly FIT test (stool test). These exams will check for colon cancer.    Talk to with your health care provider about screening for Abdominal Aortic Aneurysm if you have a family history of AAA or have a history of smoking.    Shots:     Get a flu shot each year.     Get a tetanus shot every 10 years.     Talk to your doctor about your pneumonia vaccines. There are now two you should receive - Pneumovax (PPSV 23) and Prevnar (PCV 13).     Talk to your doctor about a shingles vaccine.     Talk to your doctor about the hepatitis B vaccine.  Nutrition:     Eat at least 5 servings of fruits and vegetables each day.     Eat whole-grain bread, whole-wheat pasta and brown rice instead of white grains and rice.     Talk to your provider about Calcium and Vitamin D.   Lifestyle    Exercise for at least 150 minutes a week (30  "minutes a day, 5 days a week). This will help you control your weight and prevent disease.     Limit alcohol to one drink per day.     No smoking.     Wear sunscreen to prevent skin cancer.     See your dentist every six months for an exam and cleaning.     See your eye doctor every 1 to 2 years to screen for conditions such as glaucoma, macular degeneration, cataracts, etc           Follow-ups after your visit        Who to contact     If you have questions or need follow up information about today's clinic visit or your schedule please contact [facility] directly at [phone].  Normal or non-critical lab and imaging results will be communicated to you by iFormularyhart, letter or phone within 4 business days after the clinic has received the results. If you do not hear from us within 7 days, please contact the clinic through [employer] or phone. If you have a critical or abnormal lab result, we will notify you by phone as soon as possible.  Submit refill requests through [employer] or call your pharmacy and they will forward the refill request to us. Please allow 3 business days for your refill to be completed.          Additional Information About Your Visit        MyChart Information     [employer] lets you send messages to your doctor, view your test results, renew your prescriptions, schedule appointments and more. To sign up, go to [email or web address] . Click on \"Log in\" on the left side of the screen, which will take you to the Welcome page. Then click on \"Sign up Now\" on the right side of the page.     You will be asked to enter the access code listed below, as well as some personal information. Please follow the directions to create your username and password.     Your access code is: [email or web address]  Expires: 2018  9:27 AM     Your access code will  in 90 days. If you need help or a new code, please call your [facility] or [phone].        Care EveryWhere ID     This is your " "Care EveryWhere ID. This could be used by other organizations to access your [place] medical records  [id number]        Your Vitals Were     Pulse Temperature Respirations Height Pulse Oximetry BMI (Body Mass Index)    78 98.3  F (36.8  C) 18 5' 8\" (1.727 m) 100% 29.5 kg/m2       Blood Pressure from Last 3 Encounters:   03/23/18 142/80   09/06/17 134/72   05/24/17 130/78    Weight from Last 3 Encounters:   03/23/18 194 lb (88 kg)   09/06/17 199 lb (90.3 kg)   05/24/17 197 lb (89.4 kg)              We Performed the Following     Basic metabolic panel     Lipid panel reflex to direct LDL Fasting          Today's Medication Changes          These changes are accurate as of 3/23/18 10:42 AM.  If you have any questions, ask your nurse or doctor.               Stop taking these medicines if you haven't already. Please contact your care team if you have questions.     naproxen 375 MG tablet   Commonly known as:  NAPROSYN   Stopped by:  Cesar Manuel MD                Where to get your medicines      These medications were sent to [facility] - [facility] [address]  [address], [facility] [address]     Phone:  [phone]     lisinopril-hydrochlorothiazide 20-12.5 MG per tablet    simvastatin 20 MG tablet                Primary Care Provider Office Phone # Fax #    Cesar Manuel [phone] [address] [facility] [address]        Equal Access to Services     [facility] [facility] AH: Hadii rafael ku hadasho Soomaali, waaxda luqadaha, qaybta kaalmada adeegyada, tom brown. So [facility] [phone].    ATENCIÓN: Si habla español, tiene a telles disposición servicios gratuitos de asistencia lingüística. Llame al [phone].    We comply with applicable federal civil rights laws and [place] laws. We do not discriminate on the basis of race, color, national origin, age, disability, sex, sexual orientation, or gender identity.          "   Thank you!     Thank you for choosing [facility]  for your care. Our goal is always to provide you with excellent care. Hearing back from our patients is one way we can continue to improve our services. Please take a few minutes to complete the written survey that you may receive in the mail after your visit with us. Thank you!             Your Updated Medication List - Protect others around you: Learn how to safely use, store and throw away your medicines at www.disposemymeds.org.          This list is accurate as of 3/23/18 10:42 AM.  Always use your most recent med list.                   Brand Name Dispense Instructions for use Diagnosis    ALLERGY 4 HOUR PO           BENADRYL 25 MG tablet   Generic drug:  diphenhydrAMINE      Taking as needed for allergies.    Allergic state, initial encounter       cetirizine 10 MG tablet    zyrTEC     Take 10 mg by mouth daily        fluticasone 50 MCG/ACT spray    FLONASE    16 g    USE 2 SPRAYS INTO BOTH NOSTRILS DAILY    Allergic state, initial encounter       lisinopril-hydrochlorothiazide 20-12.5 MG per tablet    PRINZIDE/ZESTORETIC    90 tablet    Take 1 tablet by mouth daily    Mixed hyperlipidemia       multivitamin, therapeutic Tabs tablet      Take 1 tablet by mouth daily        simvastatin 20 MG tablet    ZOCOR    90 tablet    Take 1 tablet (20 mg) by mouth At Bedtime    Mixed hyperlipidemia

## 2018-03-23 NOTE — LETTER
[facility]  [address]  [facility] [address]  Phone: [phone]      3/26/2018     Shawn BENNIE Carty  [address]    [place] [address]      Dear Shawn:    Thank you for allowing me to participate in your care. Your recent test results were reviewed and listed below.      Your results are provided below for your review    Results for orders placed or performed in visit on 03/23/18   Basic metabolic panel   Result Value Ref Range    Sodium 135 133 - 144 mmol/L    Potassium 3.9 3.4 - 5.3 mmol/L    Chloride 102 94 - 109 mmol/L    Carbon Dioxide 25 20 - 32 mmol/L    Anion Gap 8 3 - 14 mmol/L    Glucose 112 (H) 70 - 99 mg/dL    Urea Nitrogen 18 7 - 30 mg/dL    Creatinine 1.37 (H) 0.66 - 1.25 mg/dL    GFR Estimate 49 (L) >60 mL/min/1.7m2    GFR Estimate If Black 59 (L) >60 mL/min/1.7m2    Calcium 9.0 8.5 - 10.1 mg/dL   Lipid panel reflex to direct LDL Fasting   Result Value Ref Range    Cholesterol 145 <200 mg/dL    Triglycerides 118 <150 mg/dL    HDL Cholesterol 52 >39 mg/dL    LDL Cholesterol Calculated 69 <100 mg/dL    Non HDL Cholesterol 93 <130 mg/dL                inform patient tests are acceptable                           Thank you for choosing [place]. As a result, please continue with the treatment plan discussed in the office. Return as discussed or sooner if symptoms worsen or fail to improve. If you have any further questions or concerns, please do not hesitate to contact us.      Sincerely,        Dr. Cesar Manuel/Sayra Millard, [facility]

## 2018-03-23 NOTE — NURSING NOTE
"Chief Complaint   Patient presents with     Physical       Initial /83  Pulse 78  Temp 98.3  F (36.8  C)  Resp 18  Ht 5' 8\" (1.727 m)  Wt 194 lb (88 kg)  SpO2 100%  BMI 29.5 kg/m2 Estimated body mass index is 29.5 kg/(m^2) as calculated from the following:    Height as of this encounter: 5' 8\" (1.727 m).    Weight as of this encounter: 194 lb (88 kg).  Medication Reconciliation: complete   Sayra Millard CMA      "

## 2018-04-13 DIAGNOSIS — T78.40XA ALLERGIC STATE, INITIAL ENCOUNTER: ICD-10-CM

## 2018-04-13 NOTE — TELEPHONE ENCOUNTER
"Requested Prescriptions   Pending Prescriptions Disp Refills     fluticasone (FLONASE) 50 MCG/ACT spray [Pharmacy Med Name: FLUTICASONE 50MCG NASAL SPRAY]  Last Written Prescription Date:  01/30/18  Last Fill Quantity: 16g,  # refills: 1   Last office visit: 3/23/2018 with prescribing provider:  3/23/18   Future Office Visit:     16 g 1     Sig: USE 2 SPRAYS INTO BOTH NOSTRILS DAILY    Inhaled Steroids Protocol Passed    4/13/2018 10:20 AM       Passed - Patient is age 12 or older       Passed - Recent (12 mo) or future (30 days) visit within the authorizing provider's specialty    Patient had office visit in the last 12 months or has a visit in the next 30 days with authorizing provider or within the authorizing provider's specialty.  See \"Patient Info\" tab in inbasket, or \"Choose Columns\" in Meds & Orders section of the refill encounter.              "

## 2018-04-16 RX ORDER — FLUTICASONE PROPIONATE 50 MCG
SPRAY, SUSPENSION (ML) NASAL
Qty: 16 G | Refills: 5 | Status: SHIPPED | OUTPATIENT
Start: 2018-04-16 | End: 2018-11-15

## 2018-04-16 NOTE — TELEPHONE ENCOUNTER
Prescription approved per [facility] Refill Protocol.    Patricia SHEPPARD RN

## 2018-11-15 DIAGNOSIS — T78.40XA ALLERGIC STATE, INITIAL ENCOUNTER: ICD-10-CM

## 2018-11-15 RX ORDER — FLUTICASONE PROPIONATE 50 MCG
SPRAY, SUSPENSION (ML) NASAL
Qty: 16 G | Refills: 3 | Status: SHIPPED | OUTPATIENT
Start: 2018-11-15 | End: 2019-03-19

## 2018-11-15 NOTE — TELEPHONE ENCOUNTER
"Requested Prescriptions   Pending Prescriptions Disp Refills     fluticasone (FLONASE) 50 MCG/ACT spray [Pharmacy Med Name: FLUTICASONE PROPIONATE 50 SUSP] 16 g 5     Sig: USE 2 SPRAYS INTO BOTH NOSTRILS DAILY    Inhaled Steroids Protocol Passed    11/15/2018 10:25 AM       Passed - Patient is age 12 or older       Passed - Recent (12 mo) or future (30 days) visit within the authorizing provider's specialty    Patient had office visit in the last 12 months or has a visit in the next 30 days with authorizing provider or within the authorizing provider's specialty.  See \"Patient Info\" tab in inbasket, or \"Choose Columns\" in Meds & Orders section of the refill encounter.                "

## 2018-11-15 NOTE — TELEPHONE ENCOUNTER
Prescription approved per [facility] [place].  Bernadette JOHNSON RN

## 2019-03-19 DIAGNOSIS — T78.40XA ALLERGIC STATE, INITIAL ENCOUNTER: ICD-10-CM

## 2019-03-19 NOTE — TELEPHONE ENCOUNTER
"Requested Prescriptions   Pending Prescriptions Disp Refills     fluticasone (FLONASE) 50 MCG/ACT nasal spray [Pharmacy Med Name: FLUTICASONE 50MCG NASAL SPRAY]  Last Written Prescription Date:  11/15/2018  Last Fill Quantity: 16g,  # refills: 3   Last office visit: 3/23/2018 with prescribing provider:  Cornelio  Future Office Visit:   Next 5 appointments (look out 90 days)    Apr 02, 2019  9:00 AM CDT  PHYSICAL with Cesar Manuel MD  [facility] [address] [address] [address]  [phone]          16 g 3     Sig: USE 2 SPRAYS INTO BOTH NOSTRILS DAILY    Inhaled Steroids Protocol Passed - 3/19/2019 10:49 AM       Passed - Patient is age 12 or older       Passed - Recent (12 mo) or future (30 days) visit within the authorizing provider's specialty    Patient had office visit in the last 12 months or has a visit in the next 30 days with authorizing provider or within the authorizing provider's specialty.  See \"Patient Info\" tab in inbasket, or \"Choose Columns\" in Meds & Orders section of the refill encounter.             Passed - Medication is active on med list          "

## 2019-03-20 RX ORDER — FLUTICASONE PROPIONATE 50 MCG
SPRAY, SUSPENSION (ML) NASAL
Qty: 16 G | Refills: 0 | Status: SHIPPED | OUTPATIENT
Start: 2019-03-20 | End: 2019-04-25

## 2019-03-20 NOTE — TELEPHONE ENCOUNTER
Patient has upcoming OV. Prescription approved per [facility] refill protocol. Bernadette JOHNSON RN

## 2019-04-02 ENCOUNTER — OFFICE VISIT (OUTPATIENT)
Dept: FAMILY MEDICINE | Facility: CLINIC | Age: 84
End: 2019-04-02
Payer: COMMERCIAL

## 2019-04-02 VITALS
OXYGEN SATURATION: 94 % | BODY MASS INDEX: 28.49 KG/M2 | RESPIRATION RATE: 18 BRPM | SYSTOLIC BLOOD PRESSURE: 140 MMHG | WEIGHT: 188 LBS | HEART RATE: 93 BPM | DIASTOLIC BLOOD PRESSURE: 68 MMHG | HEIGHT: 68 IN | TEMPERATURE: 97.3 F

## 2019-04-02 DIAGNOSIS — E78.2 MIXED HYPERLIPIDEMIA: ICD-10-CM

## 2019-04-02 DIAGNOSIS — Z00.00 ROUTINE GENERAL MEDICAL EXAMINATION AT A HEALTH CARE FACILITY: Primary | ICD-10-CM

## 2019-04-02 DIAGNOSIS — I10 HYPERTENSION GOAL BP (BLOOD PRESSURE) < 140/90: ICD-10-CM

## 2019-04-02 LAB
ANION GAP SERPL CALCULATED.3IONS-SCNC: 6 MMOL/L (ref 3–14)
BUN SERPL-MCNC: 15 MG/DL (ref 7–30)
CALCIUM SERPL-MCNC: 9.2 MG/DL (ref 8.5–10.1)
CHLORIDE SERPL-SCNC: 104 MMOL/L (ref 94–109)
CHOLEST SERPL-MCNC: 133 MG/DL
CO2 SERPL-SCNC: 30 MMOL/L (ref 20–32)
CREAT SERPL-MCNC: 1.46 MG/DL (ref 0.66–1.25)
GFR SERPL CREATININE-BSD FRML MDRD: 41 ML/MIN/{1.73_M2}
GLUCOSE SERPL-MCNC: 122 MG/DL (ref 70–99)
HDLC SERPL-MCNC: 57 MG/DL
LDLC SERPL CALC-MCNC: 53 MG/DL
NONHDLC SERPL-MCNC: 76 MG/DL
POTASSIUM SERPL-SCNC: 4.2 MMOL/L (ref 3.4–5.3)
SODIUM SERPL-SCNC: 140 MMOL/L (ref 133–144)
TRIGL SERPL-MCNC: 115 MG/DL

## 2019-04-02 PROCEDURE — 36415 COLL VENOUS BLD VENIPUNCTURE: CPT | Performed by: FAMILY MEDICINE

## 2019-04-02 PROCEDURE — G0439 PPPS, SUBSEQ VISIT: HCPCS | Performed by: FAMILY MEDICINE

## 2019-04-02 PROCEDURE — 80048 BASIC METABOLIC PNL TOTAL CA: CPT | Performed by: FAMILY MEDICINE

## 2019-04-02 PROCEDURE — 80061 LIPID PANEL: CPT | Performed by: FAMILY MEDICINE

## 2019-04-02 RX ORDER — LISINOPRIL AND HYDROCHLOROTHIAZIDE 12.5; 2 MG/1; MG/1
1 TABLET ORAL DAILY
Qty: 90 TABLET | Refills: 3 | Status: SHIPPED | OUTPATIENT
Start: 2019-04-02 | End: 2020-05-05

## 2019-04-02 RX ORDER — SIMVASTATIN 20 MG
20 TABLET ORAL AT BEDTIME
Qty: 90 TABLET | Refills: 3 | Status: SHIPPED | OUTPATIENT
Start: 2019-04-02 | End: 2020-05-05

## 2019-04-02 ASSESSMENT — MIFFLIN-ST. JEOR: SCORE: 1482.26

## 2019-04-02 NOTE — LETTER
[facility]  [address]  [place] [address]  Phone: [phone]      4/3/2019     Shawn Carty  [address]    [place] [address]      Dear Shawn:    Thank you for allowing me to participate in your care. Your recent test results were reviewed and listed below.  tests are acceptable    Your results are provided below for your review  Results for orders placed or performed in visit on 04/02/19   Lipid panel reflex to direct LDL Fasting   Result Value Ref Range    Cholesterol 133 <200 mg/dL    Triglycerides 115 <150 mg/dL    HDL Cholesterol 57 >39 mg/dL    LDL Cholesterol Calculated 53 <100 mg/dL    Non HDL Cholesterol 76 <130 mg/dL   Basic metabolic panel   Result Value Ref Range    Sodium 140 133 - 144 mmol/L    Potassium 4.2 3.4 - 5.3 mmol/L    Chloride 104 94 - 109 mmol/L    Carbon Dioxide 30 20 - 32 mmol/L    Anion Gap 6 3 - 14 mmol/L    Glucose 122 (H) 70 - 99 mg/dL    Urea Nitrogen 15 7 - 30 mg/dL    Creatinine 1.46 (H) 0.66 - 1.25 mg/dL    GFR Estimate 41 (L) >60 mL/min/[1.73_m2]    GFR Estimate If Black 48 (L) >60 mL/min/[1.73_m2]    Calcium 9.2 8.5 - 10.1 mg/dL   Thank you for choosing [facility]. As a result, please continue with the treatment plan discussed in the office. Return as discussed or sooner if symptoms worsen or fail to improve.     If you have any further questions or concerns, please do not hesitate to contact us.    Sincerely,    Dr. Cesar Manuel

## 2019-04-02 NOTE — PATIENT INSTRUCTIONS
Preventive Health Recommendations:     See your health care provider every year to    Review health changes.     Discuss preventive care.      Review your medicines if your doctor has prescribed any.    Talk with your health care provider about whether you should have a test to screen for prostate cancer (PSA).    Every 3 years, have a diabetes test (fasting glucose). If you are at risk for diabetes, you should have this test more often.    Every 5 years, have a cholesterol test. Have this test more often if you are at risk for high cholesterol or heart disease.     Every 10 years, have a colonoscopy. Or, have a yearly FIT test (stool test). These exams will check for colon cancer.    Talk to with your health care provider about screening for Abdominal Aortic Aneurysm if you have a family history of AAA or have a history of smoking.  Shots:     Get a flu shot each year.     Get a tetanus shot every 10 years.     Talk to your doctor about your pneumonia vaccines. There are now two you should receive - Pneumovax (PPSV 23) and Prevnar (PCV 13).    Talk to your pharmacist about a shingles vaccine.     Talk to your doctor about the hepatitis B vaccine.  Nutrition:     Eat at least 5 servings of fruits and vegetables each day.     Eat whole-grain bread, whole-wheat pasta and brown rice instead of white grains and rice.     Get adequate Calcium and Vitamin D.   Lifestyle    Exercise for at least 150 minutes a week (30 minutes a day, 5 days a week). This will help you control your weight and prevent disease.     Limit alcohol to one drink per day.     No smoking.     Wear sunscreen to prevent skin cancer.     See your dentist every six months for an exam and cleaning.     See your eye doctor every 1 to 2 years to screen for conditions such as glaucoma, macular degeneration and cataracts.    Personalized Prevention Plan  You are due for the preventive services outlined below.  Your care team is available to assist you in  scheduling these services.  If you have already completed any of these items, please share that information with your care team to update in your medical record.    Health Maintenance Due   Topic Date Due     Diptheria Tetanus Pertussis (DTAP/TDAP/TD) Vaccine (1 - Tdap) 03/05/1953     Zoster (Shingles) Vaccine (1 of 2) 03/05/1978     Discuss Advance Directive Planning  03/05/1983     Pneumococcal Vaccine (1 of 2 - PCV13) 03/05/1993     Flu Vaccine (1) 09/01/2018     Annual Wellness Visit  03/23/2019     FALL RISK ASSESSMENT  03/23/2019     Depression Assessment 2 - yearly  03/23/2019

## 2019-04-02 NOTE — PROGRESS NOTES
"  SUBJECTIVE:   Shawn Carty is a 89 year old male who presents for Preventive Visit.      Are you in the first 12 months of your Medicare Part B coverage?  No    Physical Health:    In general, how would you rate your overall physical health? good    Outside of work, how many days during the week do you exercise? 6-7 days/week    Outside of work, approximately how many minutes a day do you exercise?15-30 minutes    If you drink alcohol do you typically have >3 drinks per day or >7 drinks per week? No    Do you usually eat at least 4 servings of fruit and vegetables a day, include whole grains & fiber and avoid regularly eating high fat or \"junk\" foods? Yes    Do you have any problems taking medications regularly?  No    Do you have any side effects from medications? none    Needs assistance for the following daily activities: no assistance needed    Which of the following safety concerns are present in your home?  none identified     Hearing impairment: Yes,   Has hearing aids     In the past 6 months, have you been bothered by leaking of urine? no    Mental Health:    In general, how would you rate your overall mental or emotional health? good  PHQ-2 Score:      Do you feel safe in your environment? Yes    Do you have a Health Care Directive? No: Advance care planning was reviewed with patient; patient declined at this time.    Additional concerns to address?  No    Fall risk:  Fallen 2 or more times in the past year?: No  Any fall with injury in the past year?: No    Cognitive Screenin) Repeat 3 items (Leader, Season, Table)    2) Clock draw: NORMAL  3) 3 item recall: Recalls 3 objects  Results: 3 items recalled: COGNITIVE IMPAIRMENT LESS LIKELY    Mini-CogTM Nani Álvarez. Licensed by the author for use in [facility]; reprinted with permission (suzi@.[email or web address]). All rights reserved.      Do you have sleep apnea, excessive snoring or daytime drowsiness?: no        Hypertension " "Follow-up      Outpatient blood pressures are not being checked.    Low Salt Diet: no added salt      Reviewed and updated as needed this visit by clinical staff         Reviewed and updated as needed this visit by Provider        Social History     Tobacco Use     Smoking status: Former Smoker     Smokeless tobacco: Never Used   Substance Use Topics     Alcohol use: No                           Current providers sharing in care for this patient include:   Patient Care Team:  Cesar Manuel MD as PCP - General (Family Practice)  Cesar Manuel MD as Assigned PCP    The following health maintenance items are reviewed in Epic and correct as of today:  Health Maintenance   Topic Date Due     DTAP/TDAP/TD IMMUNIZATION (1 - Tdap) [birth year]05/1953     ZOSTER IMMUNIZATION (1 of 2) 03/05/1978     ADVANCE DIRECTIVE PLANNING Q5 YRS  03/05/1983     PNEUMOCOCCAL IMMUNIZATION 65+ LOW/MEDIUM RISK (1 of 2 - PCV13) 03/05/1993     INFLUENZA VACCINE (1) 09/01/2018     MEDICARE ANNUAL WELLNESS VISIT  03/23/2019     FALL RISK ASSESSMENT  03/23/2019     PHQ-2 Q1 YR  03/23/2019     IPV IMMUNIZATION  Aged Out     MENINGITIS IMMUNIZATION  Aged Out     Labs reviewed in EPIC      ROS:  Constitutional, HEENT, cardiovascular, pulmonary, GI, , musculoskeletal, neuro, skin, endocrine and psych systems are negative, except as otherwise noted.    OBJECTIVE:   There were no vitals taken for this visit. Estimated body mass index is 29.5 kg/m  as calculated from the following:    Height as of 3/23/18: 1.727 m (5' 8\").    Weight as of 3/23/18: 88 kg (194 lb).  EXAM:   GENERAL: healthy, alert and no distress  EYES: Eyes grossly normal to inspection, PERRL and conjunctivae and sclerae normal  HENT: ear canals and TM's normal, nose and mouth without ulcers or lesions  NECK: no adenopathy, no asymmetry, masses, or scars and thyroid normal to palpation  RESP: lungs clear to auscultation - no rales, rhonchi or wheezes  CV: regular rate and rhythm, normal S1 " "S2, no S3 or S4, no murmur, click or rub, no peripheral edema and peripheral pulses strong  ABDOMEN: soft, nontender, no hepatosplenomegaly, no masses and bowel sounds normal  MS: no gross musculoskeletal defects noted, no edema  SKIN: no suspicious lesions or rashes  NEURO: Normal strength and tone, mentation intact and speech normal  PSYCH: mentation appears normal, affect normal/bright    Diagnostic Test Results:  none     ASSESSMENT / PLAN:   Shawn was seen today for wellness visit.    Diagnoses and all orders for this visit:    Routine general medical examination at a health care facility    Mixed hyperlipidemia  -     lisinopril-hydrochlorothiazide (PRINZIDE/ZESTORETIC) 20-12.5 MG tablet; Take 1 tablet by mouth daily  -     simvastatin (ZOCOR) 20 MG tablet; Take 1 tablet (20 mg) by mouth At Bedtime  -     Lipid panel reflex to direct LDL Fasting  -     Basic metabolic panel    Hypertension goal BP (blood pressure) < 140/90        End of Life Planning:  Patient currently has an advanced directive:     COUNSELING:  Reviewed preventive health counseling, as reflected in patient instructions       Regular exercise       Healthy diet/nutrition    BP Readings from Last 1 Encounters:   03/23/18 142/80     Estimated body mass index is 29.5 kg/m  as calculated from the following:    Height as of 3/23/18: 1.727 m (5' 8\").    Weight as of 3/23/18: 88 kg (194 lb).           reports that he has quit smoking. he has never used smokeless tobacco.      Appropriate preventive services were discussed with this patient, including applicable screening as appropriate for cardiovascular disease, diabetes, osteopenia/osteoporosis, and glaucoma.  As appropriate for age/gender, discussed screening for colorectal cancer, prostate cancer, breast cancer, and cervical cancer. Checklist reviewing preventive services available has been given to the patient.    Reviewed patients plan of care and provided an AVS. The Basic Care Plan " (routine screening as documented in Health Maintenance) for Shawn meets the Care Plan requirement. This Care Plan has been established and reviewed with the Patient.    Counseling Resources:  ATP IV Guidelines  Pooled Cohorts Equation Calculator  Breast Cancer Risk Calculator  FRAX Risk Assessment  ICSI Preventive Guidelines  Dietary Guidelines for Americans, 2010  USDA's MyPlate  ASA Prophylaxis  Lung CA Screening    Cesar Manuel MD  [facility]

## 2019-04-25 DIAGNOSIS — T78.40XA ALLERGIC STATE, INITIAL ENCOUNTER: ICD-10-CM

## 2019-04-25 RX ORDER — FLUTICASONE PROPIONATE 50 MCG
SPRAY, SUSPENSION (ML) NASAL
Qty: 16 G | Refills: 1 | Status: SHIPPED | OUTPATIENT
Start: 2019-04-25 | End: 2019-06-26

## 2019-06-26 DIAGNOSIS — T78.40XA ALLERGIC STATE, INITIAL ENCOUNTER: ICD-10-CM

## 2019-06-26 NOTE — TELEPHONE ENCOUNTER
"Requested Prescriptions   Pending Prescriptions Disp Refills     fluticasone (FLONASE) 50 MCG/ACT nasal spray [Pharmacy Med Name: FLUTICASONE 50MCG NASAL SPRAY] 16 g 1     Sig: USE 2 SPRAYS INTO BOTH NOSTRILS DAILY       Inhaled Steroids Protocol Passed - 6/26/2019 11:01 AM        Passed - Patient is age 12 or older        Passed - Recent (12 mo) or future (30 days) visit within the authorizing provider's specialty     Patient had office visit in the last 12 months or has a visit in the next 30 days with authorizing provider or within the authorizing provider's specialty.  See \"Patient Info\" tab in inbasket, or \"Choose Columns\" in Meds & Orders section of the refill encounter.              Passed - Medication is active on med list        Last Written Prescription Date:  4/25/19  Last Fill Quantity: 16,  # refills: 1   Last office visit: 4/2/2019 with prescribing provider:  Mar   Future Office Visit:      "

## 2019-06-27 RX ORDER — FLUTICASONE PROPIONATE 50 MCG
SPRAY, SUSPENSION (ML) NASAL
Qty: 16 G | Refills: 1 | Status: SHIPPED | OUTPATIENT
Start: 2019-06-27 | End: 2019-08-21

## 2019-08-21 DIAGNOSIS — T78.40XA ALLERGIC STATE, INITIAL ENCOUNTER: ICD-10-CM

## 2019-08-21 RX ORDER — FLUTICASONE PROPIONATE 50 MCG
SPRAY, SUSPENSION (ML) NASAL
Qty: 16 G | Refills: 1 | Status: SHIPPED | OUTPATIENT
Start: 2019-08-21 | End: 2019-10-23

## 2019-08-21 NOTE — TELEPHONE ENCOUNTER
"Requested Prescriptions   Pending Prescriptions Disp Refills     fluticasone (FLONASE) 50 MCG/ACT nasal spray [Pharmacy Med Name: FLUTICASONE 50MCG NASAL SPRAY] 16 g 1     Sig: USE 2 SPRAYS INTO BOTH NOSTRILS DAILY   Last Written Prescription Date:  6/27/19  Last Fill Quantity: 16g,  # refills: 1   Last office visit: 4/2/2019 with prescribing provider:  Cesar Manuel     Future Office Visit:        Inhaled Steroids Protocol Passed - 8/21/2019 11:00 AM        Passed - Patient is age 12 or older        Passed - Recent (12 mo) or future (30 days) visit within the authorizing provider's specialty     Patient had office visit in the last 12 months or has a visit in the next 30 days with authorizing provider or within the authorizing provider's specialty.  See \"Patient Info\" tab in inbasket, or \"Choose Columns\" in Meds & Orders section of the refill encounter.              Passed - Medication is active on med list          "

## 2019-10-23 DIAGNOSIS — T78.40XA ALLERGIC STATE, INITIAL ENCOUNTER: ICD-10-CM

## 2019-10-23 NOTE — TELEPHONE ENCOUNTER
"Requested Prescriptions   Pending Prescriptions Disp Refills     fluticasone (FLONASE) 50 MCG/ACT nasal spray [Pharmacy Med Name: FLUTICASONE 50MCG NASAL SPRAY] 16 g 1     Sig: USE 2 SPRAYS INTO BOTH NOSTRILS DAILY       Inhaled Steroids Protocol Passed - 10/23/2019 10:52 AM        Passed - Patient is age 12 or older        Passed - Recent (12 mo) or future (30 days) visit within the authorizing provider's specialty     Patient has had an office visit with the authorizing provider or a provider within the authorizing providers department within the previous 12 mos or has a future within next 30 days. See \"Patient Info\" tab in inbasket, or \"Choose Columns\" in Meds & Orders section of the refill encounter.              Passed - Medication is active on med list        Last Written Prescription Date:  8/21/19  Last Fill Quantity: 16,  # refills: 1   Last office visit: 4/2/2019 with prescribing provider:  Mar   Future Office Visit:      "

## 2019-10-24 RX ORDER — FLUTICASONE PROPIONATE 50 MCG
SPRAY, SUSPENSION (ML) NASAL
Qty: 16 G | Refills: 4 | Status: SHIPPED | OUTPATIENT
Start: 2019-10-24 | End: 2020-03-14

## 2019-10-24 NOTE — TELEPHONE ENCOUNTER
Prescription approved per [facility] Refill Protocol.   Last OV 4/2/19: Return in about 1 year (around 4/2/2020).     Lena GARCIA RN

## 2020-03-10 DIAGNOSIS — T78.40XA ALLERGIC STATE, INITIAL ENCOUNTER: ICD-10-CM

## 2020-03-10 NOTE — TELEPHONE ENCOUNTER
Requested Prescriptions   Pending Prescriptions Disp Refills     fluticasone (FLONASE) 50 MCG/ACT nasal spray [Pharmacy Med Name: FLUTICASONE 50MCG NASAL SPRAY] 16 g 4     Sig: USE 2 SPRAYS INTO BOTH NOSTRILS DAILY       There is no refill protocol information for this order        Last Written Prescription Date:  10/24/19  Last Fill Quantity: 16 g.,  # refills: 4   Last office visit: 4/2/2019 with prescribing provider:  Mar   Future Office Visit:

## 2020-03-14 RX ORDER — FLUTICASONE PROPIONATE 50 MCG
SPRAY, SUSPENSION (ML) NASAL
Qty: 16 G | Refills: 3 | Status: SHIPPED | OUTPATIENT
Start: 2020-03-14 | End: 2020-06-30

## 2020-04-26 ENCOUNTER — APPOINTMENT (OUTPATIENT)
Dept: CT IMAGING | Facility: CLINIC | Age: 85
End: 2020-04-26
Attending: FAMILY MEDICINE
Payer: COMMERCIAL

## 2020-04-26 ENCOUNTER — HOSPITAL ENCOUNTER (EMERGENCY)
Facility: CLINIC | Age: 85
Discharge: HOME | End: 2020-04-26
Attending: FAMILY MEDICINE | Admitting: FAMILY MEDICINE
Payer: COMMERCIAL

## 2020-04-26 VITALS
OXYGEN SATURATION: 93 % | HEART RATE: 76 BPM | DIASTOLIC BLOOD PRESSURE: 49 MMHG | RESPIRATION RATE: 18 BRPM | SYSTOLIC BLOOD PRESSURE: 118 MMHG | BODY MASS INDEX: 26.61 KG/M2 | TEMPERATURE: 97.8 F | WEIGHT: 175 LBS

## 2020-04-26 DIAGNOSIS — S39.91XA INJURY OF ABDOMINAL WALL, INITIAL ENCOUNTER: ICD-10-CM

## 2020-04-26 LAB
ANION GAP SERPL CALCULATED.3IONS-SCNC: 3 MMOL/L (ref 3–14)
BASOPHILS # BLD AUTO: 0 10E9/L (ref 0–0.2)
BASOPHILS NFR BLD AUTO: 0.4 %
BUN SERPL-MCNC: 27 MG/DL (ref 7–30)
CALCIUM SERPL-MCNC: 12.9 MG/DL (ref 8.5–10.1)
CHLORIDE SERPL-SCNC: 91 MMOL/L (ref 94–109)
CO2 SERPL-SCNC: 32 MMOL/L (ref 20–32)
CREAT SERPL-MCNC: 1.57 MG/DL (ref 0.66–1.25)
DIFFERENTIAL METHOD BLD: ABNORMAL
EOSINOPHIL # BLD AUTO: 0.1 10E9/L (ref 0–0.7)
EOSINOPHIL NFR BLD AUTO: 0.8 %
ERYTHROCYTE [DISTWIDTH] IN BLOOD BY AUTOMATED COUNT: 12.4 % (ref 10–15)
GFR SERPL CREATININE-BSD FRML MDRD: 38 ML/MIN/{1.73_M2}
GLUCOSE SERPL-MCNC: 143 MG/DL (ref 70–99)
HCT VFR BLD AUTO: 46.5 % (ref 40–53)
HGB BLD-MCNC: 16 G/DL (ref 13.3–17.7)
IMM GRANULOCYTES # BLD: 0 10E9/L (ref 0–0.4)
IMM GRANULOCYTES NFR BLD: 0.3 %
LYMPHOCYTES # BLD AUTO: 0.4 10E9/L (ref 0.8–5.3)
LYMPHOCYTES NFR BLD AUTO: 6.2 %
MCH RBC QN AUTO: 31.8 PG (ref 26.5–33)
MCHC RBC AUTO-ENTMCNC: 34.4 G/DL (ref 31.5–36.5)
MCV RBC AUTO: 92 FL (ref 78–100)
MONOCYTES # BLD AUTO: 0.6 10E9/L (ref 0–1.3)
MONOCYTES NFR BLD AUTO: 8.4 %
NEUTROPHILS # BLD AUTO: 5.9 10E9/L (ref 1.6–8.3)
NEUTROPHILS NFR BLD AUTO: 83.9 %
NRBC # BLD AUTO: 0 10*3/UL
NRBC BLD AUTO-RTO: 0 /100
PLATELET # BLD AUTO: 280 10E9/L (ref 150–450)
POTASSIUM SERPL-SCNC: 4.5 MMOL/L (ref 3.4–5.3)
RBC # BLD AUTO: 5.03 10E12/L (ref 4.4–5.9)
SODIUM SERPL-SCNC: 126 MMOL/L (ref 133–144)
WBC # BLD AUTO: 7.1 10E9/L (ref 4–11)

## 2020-04-26 PROCEDURE — 80048 BASIC METABOLIC PNL TOTAL CA: CPT | Performed by: FAMILY MEDICINE

## 2020-04-26 PROCEDURE — 72192 CT PELVIS W/O DYE: CPT

## 2020-04-26 PROCEDURE — 74176 CT ABD & PELVIS W/O CONTRAST: CPT

## 2020-04-26 PROCEDURE — 99284 EMERGENCY DEPT VISIT MOD MDM: CPT | Mod: Z6 | Performed by: FAMILY MEDICINE

## 2020-04-26 PROCEDURE — 85025 COMPLETE CBC W/AUTO DIFF WBC: CPT | Performed by: FAMILY MEDICINE

## 2020-04-26 PROCEDURE — 99285 EMERGENCY DEPT VISIT HI MDM: CPT | Mod: 25 | Performed by: FAMILY MEDICINE

## 2020-04-26 NOTE — ED AVS SNAPSHOT
[facility]  [address] [address]  Phone:  [phone]  Fax:  [phone]                                    Shawn Carty   MRN: [id number]    Department:  [facility]   Date of Visit:  4/26/2020           After Visit Summary Signature Page    I have received my discharge instructions, and my questions have been answered. I have discussed any challenges I see with this plan with the nurse or doctor.    ..........................................................................................................................................  Patient/Patient Representative Signature      ..........................................................................................................................................  Patient Representative Print Name and Relationship to Patient    ..................................................               ................................................  Date                                   Time    ..........................................................................................................................................  Reviewed by Signature/Title    ...................................................              ..............................................  Date                                               Time          22EPIC Rev 08/18

## 2020-04-26 NOTE — ED PROVIDER NOTES
HPI   The patient is a 89-year-old male presenting with pain felt in his right lower abdomen and right hip region.  He has a distant history of injury to the right hip and musculature, though no surgery was required and no fracture reported.  He had acupuncture to help relieve chronic pain associated with that injury, this performed about 15 years ago.    On about April 14, the patient was at home and he was getting up into the shower as he typically does.  He did not have handle bars available at that time and so he had trouble, ultimately falling and hitting the side of the tub against his right lower abdomen and right hip region.  He describes having pain in the same area since that time.  He has difficulty getting up and moving around compared to his usual baseline.  Pain is limiting factor.  He denies fever.  He denies nausea or vomiting.  He denies any bowel changes.  He denies new urinary symptoms.  He does not feel sick systemically.        Allergies:  No Known Allergies  Problem List:    Patient Active Problem List    Diagnosis Date Noted     Allergic state, initial encounter 09/06/2017     Priority: Medium     Mixed hyperlipidemia 03/07/2016     Priority: Medium     Hypertension goal BP (blood pressure) < 140/90 09/29/2014     Priority: Medium      Past Medical History:    History reviewed. No pertinent past medical history.  Past Surgical History:    Past Surgical History:   Procedure Laterality Date     SURGICAL HISTORY OF -   2008    dupuytrens contracture repair R     SURGICAL HISTORY OF -   2008    dupuytrens contracture repair L     Family History:    Family History   Problem Relation Age of Onset     Diabetes Mother      Cancer Mother      Neurologic Disorder Mother      Cancer Brother      Cancer - colorectal Brother      Prostate Cancer Brother      Diabetes Brother      Diabetes Brother      Neurologic Disorder Brother      Diabetes Sister      Social History:  Marital Status:    [2]  Social History     Tobacco Use     Smoking status: Former Smoker     Smokeless tobacco: Never Used   Substance Use Topics     Alcohol use: No     Drug use: No      Medications:    cetirizine (ZYRTEC) 10 MG tablet  fluticasone (FLONASE) 50 MCG/ACT nasal spray  lisinopril-hydrochlorothiazide (PRINZIDE/ZESTORETIC) 20-12.5 MG tablet  multivitamin, therapeutic (THERA-VIT) TABS  simvastatin (ZOCOR) 20 MG tablet      Review of Systems   All other systems reviewed and are negative.      PE   BP: 122/70  Pulse: 78  Temp: 97.8  F (36.6  C)  Resp: 18  Weight: 79.4 kg (175 lb)  SpO2: 97 %  Physical Exam  Vitals signs and nursing note reviewed.   Constitutional:       General: He is not in acute distress.     Appearance: He is not diaphoretic.      Comments: Smiling, cooperative, eager to relay a history.   HENT:      Head: Atraumatic.      Nose: Nose normal.      Mouth/Throat:      Mouth: Mucous membranes are moist.   Eyes:      General: No scleral icterus.     Pupils: Pupils are equal, round, and reactive to light.   Neck:      Musculoskeletal: Normal range of motion.   Cardiovascular:      Heart sounds: Normal heart sounds.   Pulmonary:      Effort: No respiratory distress.      Breath sounds: Normal breath sounds.   Abdominal:      General: Bowel sounds are normal.      Palpations: Abdomen is soft.      Comments: The patient has tenderness in the right abdomen.  No guarding.  Soft throughout.  No organomegaly or mass.  No distention.  Ecchymosis is present in the right lower quadrant.   Musculoskeletal: Normal range of motion.         General: No tenderness.      Comments: He denies tenderness as I push onto the greater trochanter and move the right hip through full range of motion.  He has strength 5/5 at the hip and below on the right.  He denies tenderness as I push on the pelvis.   Skin:     General: Skin is warm.      Findings: No rash.   Neurological:      Mental Status: He is alert and oriented to person, place,  and time.   Psychiatric:         Behavior: Behavior normal.         ED COURSE and MDM   1143.  The patient has a recent fall with associated pain.  He continues to have pain in this area of injury.  He has abdominal tenderness and ecchymosis.  His hip and pelvis are not tender and he has full range of motion without obvious pain.  CT scan abdomen and pelvis without contrast ordered.    1244.  CT scan shows ecchymosis and some blood within the soft tissue but no severe bleeding or intra-abdominal pathology.  Pelvis structure is unremarkable for acute pathology.  Follow-up discussed.  The patient reiterates the fact that he is comfortable going home and that he feels like he cannot care for himself well with resources that are available already.  He does not want to stay in the hospital or find alternative options for help.    LABS  Labs Ordered and Resulted from Time of ED Arrival Up to the Time of Departure from the ED   CBC WITH PLATELETS DIFFERENTIAL - Abnormal; Notable for the following components:       Result Value    Absolute Lymphocytes 0.4 (*)     All other components within normal limits   BASIC METABOLIC PANEL - Abnormal; Notable for the following components:    Sodium 126 (*)     Chloride 91 (*)     Glucose 143 (*)     Creatinine 1.57 (*)     GFR Estimate 38 (*)     GFR Estimate If Black 44 (*)     Calcium 12.9 (*)     All other components within normal limits       IMAGING  Images reviewed by me.  Radiology report also reviewed.  CT Pelvis Bone wo Contrast   Final Result   Impression:   1.  No evidence of acute osseous abnormality.   2.  Moderate bilateral hip joint degenerative changes with   chondrocalcinosis.    3.  Nonspecific soft tissue swelling overlying the bilateral hips,   cannot exclude contusion.   4.  Other ancillary findings as described above.      JESSICA OLGUIN MD      Abd/pelvis CT - no contrast - Stone Protocol   Final Result   IMPRESSION:    1.  No noncontrast evidence of acute  abdominal or pelvic process.   2.  Small right pleural effusion and probable bibasilar atelectasis or   scarring.   3.  Moderate enlargement of the urinary bladder, likely due to partial   flow compromise from prostate enlargement.   4.  Small fat-containing paraumbilical hernia.   5.  Cholelithiasis.      RADHA HINDS MD          Procedures    Medications - No data to display      IMPRESSION       ICD-10-CM    1. Injury of abdominal wall, initial encounter  S39.91XA             Medication List      There are no discharge medications for this visit.                       Jj Siegel MD  04/26/20 1245

## 2020-04-26 NOTE — DISCHARGE INSTRUCTIONS
Return to the Emergency Room if the following occurs:     Fever >101, severely worsened pain, or for any concern at anytime.    Or, follow-up with the following provider as we discussed:     Return to your primary doctor as needed, or if not improved over the next 7 days.    Medications discussed:    None new.  No changes.    If you received pain-relieving or sedating medication during your time in the ER, avoid alcohol, driving automobiles, or working with machinery.  Also, a responsible adult must stay with you.        Call the Nurse Advice Line at [phone] or [phone] for any concern at anytime.

## 2020-04-26 NOTE — ED NOTES
"Fall on April 14th (kept saying August until reoriented) hitting right hip area on tub. Has had ongoing pain since, increased trouble with walking, uses a walker to get around but states doesn't walk a lot, has a recliner at home that assists him to stand. Large bruise present to right lower abdomen towards the side with a hardened area on palpation. Lives with wife who is the primary caregiver \"she's only 84\"  "

## 2020-04-30 ENCOUNTER — TELEPHONE (OUTPATIENT)
Dept: FAMILY MEDICINE | Facility: CLINIC | Age: 85
End: 2020-04-30

## 2020-04-30 NOTE — TELEPHONE ENCOUNTER
Pt was seen in ER for a fall 4/15/2020. Has had multiple falls since and she is concerned about his balance.  Should he be seen in clinic? daughter is looking into Hospice  Please advise.      Thank you,   Dariel PORTER   [place] Scheduling  [phone]

## 2020-05-04 ENCOUNTER — VIRTUAL VISIT (OUTPATIENT)
Dept: FAMILY MEDICINE | Facility: CLINIC | Age: 85
End: 2020-05-04
Payer: COMMERCIAL

## 2020-05-04 DIAGNOSIS — M62.81 MUSCLE WEAKNESS (GENERALIZED): Primary | ICD-10-CM

## 2020-05-04 DIAGNOSIS — R29.6 FALLS FREQUENTLY: ICD-10-CM

## 2020-05-04 PROCEDURE — 99213 OFFICE O/P EST LOW 20 MIN: CPT | Mod: 95 | Performed by: FAMILY MEDICINE

## 2020-05-04 NOTE — PROGRESS NOTES
"Shawn Carty is a 89 year old male who is being evaluated via a billable telephone visit.      The patient has been notified of following:     \"This telephone visit will be conducted via a call between you and your physician/provider. We have found that certain health care needs can be provided without the need for a physical exam.  This service lets us provide the care you need with a short phone conversation.  If a prescription is necessary we can send it directly to your pharmacy.  If lab work is needed we can place an order for that and you can then stop by our lab to have the test done at a later time.    Telephone visits are billed at different rates depending on your insurance coverage. During this emergency period, for some insurers they may be billed the same as an in-person visit.  Please reach out to your insurance provider with any questions.    If during the course of the call the physician/provider feels a telephone visit is not appropriate, you will not be charged for this service.\"    Patient has given verbal consent for Telephone visit?  Yes    What phone number would you like to be contacted at? [phone]    How would you like to obtain your AVS? Mail a copy    Subjective     Shawn Carty is a 89 year old male who presents to clinic today for the following health issues:    HPI      Chief Complaint   Patient presents with     Extremity Weakness     legs                     Reviewed and updated as needed this visit by Provider         Review of Systems          Objective   Reported vitals:  There were no vitals taken for this visit.     PSYCH: Alert and oriented times 3; coherent speech, normal   rate and volume, able to articulate logical thoughts, able   to abstract reason, no tangential thoughts, no hallucinations   or delusions  His affect is   RESP: No cough, no audible wheezing, able to talk in full sentences  Remainder of exam unable to be completed due to telephone " visits            Assessment/Plan:  1. Muscle weakness (generalized)      2. Falls frequently  He is at home with his wife and daughter and they are struggling with what to do with him because he is falling frequently.  They have not had any in-home evaluation and they are wondering about other living situations versus in-home help.  - HOME CARE NURSING REFERRAL    No follow-ups on file.      Phone call duration:  22 minutes    Cesar Manuel MD

## 2020-05-05 ENCOUNTER — VIRTUAL VISIT (OUTPATIENT)
Dept: FAMILY MEDICINE | Facility: CLINIC | Age: 85
End: 2020-05-05
Payer: COMMERCIAL

## 2020-05-05 ENCOUNTER — TELEPHONE (OUTPATIENT)
Dept: FAMILY MEDICINE | Facility: CLINIC | Age: 85
End: 2020-05-05

## 2020-05-05 DIAGNOSIS — R29.6 FALLS FREQUENTLY: Primary | ICD-10-CM

## 2020-05-05 DIAGNOSIS — E78.2 MIXED HYPERLIPIDEMIA: ICD-10-CM

## 2020-05-05 PROCEDURE — 99207 ZZC NON-BILLABLE SERV PER CHARTING: CPT | Mod: 95 | Performed by: FAMILY MEDICINE

## 2020-05-05 RX ORDER — SIMVASTATIN 20 MG
20 TABLET ORAL AT BEDTIME
Qty: 90 TABLET | Refills: 3 | Status: SHIPPED | OUTPATIENT
Start: 2020-05-05 | End: 2021-05-20

## 2020-05-05 RX ORDER — LISINOPRIL AND HYDROCHLOROTHIAZIDE 12.5; 2 MG/1; MG/1
1 TABLET ORAL DAILY
Qty: 90 TABLET | Refills: 3 | Status: SHIPPED | OUTPATIENT
Start: 2020-05-05 | End: 2021-05-21 | Stop reason: SINTOL

## 2020-05-05 NOTE — PROGRESS NOTES
"Shawn Carty is a 89 year old male who is being evaluated via a billable video visit.      The patient has been notified of following:     \"This video visit will be conducted via a call between you and your physician/provider. We have found that certain health care needs can be provided without the need for an in-person physical exam.  This service lets us provide the care you need with a video conversation.  If a prescription is necessary we can send it directly to your pharmacy.  If lab work is needed we can place an order for that and you can then stop by our lab to have the test done at a later time.    Video visits are billed at different rates depending on your insurance coverage.  Please reach out to your insurance provider with any questions.    If during the course of the call the physician/provider feels a video visit is not appropriate, you will not be charged for this service.\"    Patient has given verbal consent for Video visit? Yes    How would you like to obtain your AVS? Mail a copy    Patient would like the video invitation sent by: Send to e-mail at: No e-mail address on record     [email or web address]     Will anyone else be joining your video visit? No      Subjective     Shawn Carty is a 89 year old male who presents to clinic today for the following health issues:    HPI  Hypertension Follow-up      Do you check your blood pressure regularly outside of the clinic? Yes     Are you following a low salt diet? Yes    Are your blood pressures ever more than 140 on the top number (systolic) OR more   than 90 on the bottom number (diastolic), for example 140/90? Yes      How many servings of fruits and vegetables do you eat daily?  2-3    On average, how many sweetened beverages do you drink each day (Examples: soda, juice, sweet tea, etc.  Do NOT count diet or artificially sweetened beverages)?   0    How many days per week do you exercise enough to make your heart beat faster? 3 or " "less    How many minutes a day do you exercise enough to make your heart beat faster? 9 or less    How many days per week do you miss taking your medication? 0         Video Start Time:             Reviewed and updated as needed this visit by Provider         Review of Systems   ROS COMP: Constitutional, HEENT, cardiovascular, pulmonary, GI, , musculoskeletal, neuro, skin, endocrine and psych systems are negative, except as otherwise noted.      Objective    There were no vitals taken for this visit.  Estimated body mass index is 26.61 kg/m  as calculated from the following:    Height as of 4/2/19: 1.727 m (5' 8\").    Weight as of 4/26/20: 79.4 kg (175 lb).  Physical Exam     GENERAL: healthy, alert and no distress  EYES: Eyes grossly normal to inspection, conjunctivae and sclerae normal  RESP: no audible wheeze, cough, or visible cyanosis.  No visible retractions or increased work of breathing.  Able to speak fully in complete sentences.  NEURO: Cranial nerves grossly intact, mentation intact and speech normal  PSYCH: mentation appears normal, affect normal/bright, judgement and insight intact, normal speech and appearance well-groomed      Diagnostic Test Results:  Labs reviewed in Epic        ASSESSMENT:     Mixed hyperlipidemia  Falls frequently        Documentation of Face-to-Face and Certification for Home Health Services     Patient: Shawn Carty   YOB: 1928  MR Number: [id number]  Today's Date: 5/5/2020    I certify that patient: Shawn Carty is under my care and that I, or a nurse practitioner or physician's assistant working with me, had a face-to-face encounter that meets the physician face-to-face encounter requirements with this patient on: 5/5/2020.    This encounter with the patient was in whole, or in part, for the following medical condition, which is the primary reason for home health care: Falls.    I certify that, based on my findings, the following services are " medically necessary home health services: Nursing.    My clinical findings support the need for the above services because: Nurse is needed: assessment.    Further, I certify that my clinical findings support that this patient is homebound (i.e. absences from home require considerable and taxing effort and are for medical reasons or Judaism services or infrequently or of short duration when for other reasons) because: Requires assistance of another person or specialized equipment to access medical services because patient: Requires supervision of another for safe transfer...    Based on the above findings. I certify that this patient is confined to the home and needs intermittent skilled nursing care, physical therapy and/or speech therapy.  The patient is under my care, and I have initiated the establishment of the plan of care.  This patient will be followed by a physician who will periodically review the plan of care.  Physician/Provider to provide follow up care: Cesar Manuel    Responsible Medicare certified PECOS Physician: Cesar Manuel MD    Physician Signature: See electronic signature associated with these discharge orders.  Date: 5/5/2020    Video-Visit Details    Type of service:  Video Visit    Video End Time:    Originating Location (pt. Location): Home    Distant Location (provider location):  [facility]     Platform used for Video Visit: Lur[employer]    No follow-ups on file.       Cesar Manuel MD

## 2020-05-05 NOTE — TELEPHONE ENCOUNTER
Spoke with Savita, patient's wife.  They have a computer, not sure if it has a camera.  Not sure how to set up MyChart for a virtual visit.  Savita suggested I contact their daughter Yissel (who has a smartphone) to get video visit set up.    Left message for Yissel to call us back.    Josy Hurst RN

## 2020-05-05 NOTE — TELEPHONE ENCOUNTER
Reason for Call:  Other     Detailed comments: Nicolette with [facility] is calling and regarding his appt yesterday, it will not be covered by medicare, he will need a face to face or Video appt to be covered.      Phone Number Patient can be reached at: Other phone number:  Nicolette is at [phone]    Best Time: any    Can we leave a detailed message on this number? YES    Call taken on 5/5/2020 at 10:23 AM by Rosa Ahuja

## 2020-05-05 NOTE — TELEPHONE ENCOUNTER
Spoke with daughterYissel.  She has a smart phone she can connect to her parent's WiFi.  Rooming phone call should be done via home phone number and link to video visit can be sent to the daughter's cell [phone].    Dr. Mar LUGO - needs video or F2F visit for homecare.  Scheduled for 4:00pm today.    Josy Hurst RN

## 2020-05-06 ENCOUNTER — TELEPHONE (OUTPATIENT)
Dept: FAMILY MEDICINE | Facility: CLINIC | Age: 85
End: 2020-05-06

## 2020-05-06 NOTE — TELEPHONE ENCOUNTER
[facility] now requests orders and shares plan of care/discharge summaries for some patients through [employer].  Please REPLY TO THIS MESSAGE OR ROUTE BACK TO THE AUTHOR in order to give authorization for orders when needed.  This is considered a verbal order, you will still receive a faxed copy of orders for signature.  Thank you for your assistance in improving collaboration for our patients.    ORDER: SN 1x/week for 4 weeks and 3 prn visits for assessment and educate on safety and falls prevention, pain assessment and management, assess and educate on nutrition/hydration, assess edema in BLE.   Scheduled SN visits to start the week of 5/11/20.  PT eval for lower extremity strengthening, standing balance and gait training, comprehensive falls risk assessment, and home exercises.  HHA 2x/week for assistance with showering and personal cares.  Pt declined OT and SW.    Please let me know if you have any questions.  Thank you,   [email or web address]  [phone]

## 2020-05-12 ENCOUNTER — DOCUMENTATION ONLY (OUTPATIENT)
Dept: FAMILY MEDICINE | Facility: CLINIC | Age: 85
End: 2020-05-12

## 2020-05-12 NOTE — PROGRESS NOTES
[facility] now requests orders and shares plan of care/discharge summaries for some patients through [employer].  Please REPLY TO THIS MESSAGE OR ROUTE BACK TO THE AUTHOR in order to give authorization for orders when needed.  This is considered a verbal order, you will still receive a faxed copy of orders for signature.  Thank you for your assistance in improving collaboration for our patients.    ORDER    PT christine done yesterday , client is improving nicely and learned he could do many things pain free with the PT eval. He would like to work on his home exercise program and walking  on his own, But agreed to one follow up visit next week to make sure he is continuing to improves.

## 2020-05-19 ENCOUNTER — MEDICAL CORRESPONDENCE (OUTPATIENT)
Dept: HEALTH INFORMATION MANAGEMENT | Facility: CLINIC | Age: 85
End: 2020-05-19

## 2020-05-22 ENCOUNTER — PATIENT OUTREACH (OUTPATIENT)
Dept: CARE COORDINATION | Facility: CLINIC | Age: 85
End: 2020-05-22

## 2020-05-22 ASSESSMENT — ACTIVITIES OF DAILY LIVING (ADL): DEPENDENT_IADLS:: INDEPENDENT

## 2020-05-22 NOTE — LETTER
[place] CARE COORDINATION  [facility]  [address]  [facility] [address]  Phone: [phone]    May 22, 2020    Shawn Carty  [id number]   [place] [facility] [address]      Dear Shawn,    I am a clinic care coordinator who works with Cesar Manuel MD at [facility]. I wanted to thank you for spending the time to talk with me.  Below is a description of clinic care coordination and how I can further assist you.      The clinic care coordination team is made up of a registered nurse,  and community health worker who understand the health care system. The goal of clinic care coordination is to help you manage your health and improve access to the health care system in the most efficient manner. The team can assist you in meeting your health care goals by providing education, coordinating services, strengthening the communication among your providers and supporting you with any resource needs.    Please feel free to contact me at [phone] with any questions or concerns. We are focused on providing you with the highest-quality healthcare experience possible and that all starts with you.     Sincerely,     Madhu Pastor RN  Clinic Care Coordinator

## 2020-05-22 NOTE — PROGRESS NOTES
Clinic Care Coordination Contact    Clinic Care Coordination Contact  OUTREACH    Referral Information:  Referral Source: Home Care    Primary Diagnosis: GI Disorders    Chief Complaint   Patient presents with     Clinic Care Coordination - Initial     Care Team        [place] Utilization: Patient identified for Care Coordination follow up due to:  Hospitalization:   NO  Emergency Department utilization risk:  NO  Fall risk:   NO  Pain:  NO  Medication concerns:  NO  Social Determinants of Health risks:   NO    Clinic Utilization  Difficulty keeping appointments:: No  Compliance Concerns: No  No-Show Concerns: No  No PCP office visit in Past Year: No  Utilization    Last refreshed: 5/21/2020  8:31 PM:  Hospital Admissions 0           Last refreshed: 5/21/2020  8:31 PM:  ED Visits 1           Last refreshed: 5/17/2020  7:17 AM:  No Show Count (past year) 0              Current as of: 5/17/2020  7:17 AM              Clinical Concerns:  Current Medical Concerns:  Patient identified for Clinic Care Coordination outreach by Home Care discharge. Patient contacted and Patient reported he is doing well at home. Patient had no concerns with home care being discharged. Patient able to get ADL/iADL needs met. No concerns with medications reported.     Current Behavioral Concerns: none    Education Provided to patient: Encouraged follow up appointment with PCP.     Pain  Pain (GOAL):: No  Health Maintenance Reviewed: Due/Overdue   Health Maintenance Due   Topic Date Due     ADVANCE CARE PLANNING  03/05/1928     DTAP/TDAP/TD IMMUNIZATION (1 - Tdap) 03/05/1953     ZOSTER IMMUNIZATION (1 of 2) 03/05/1978     PNEUMOCOCCAL IMMUNIZATION 65+ LOW/MEDIUM RISK (1 of 2 - PCV13) 03/05/1993     MEDICARE ANNUAL WELLNESS VISIT  04/02/2020     Medication Management:  Medication reconciliation status: Medications reviewed and reconciled.  Continue medications without change.       Functional Status:  Dependent ADLs:: Independent  Dependent  IADLs:: Independent  Bed or wheelchair confined:: No  Mobility Status: Independent    Living Situation:  Current living arrangement:: I live in a private home with spouse  Type of residence:: Private home - no stairs    Lifestyle & Psychosocial Needs:        Diet:: Regular  Inadequate nutrition (GOAL):: No  Tube Feeding: No  Inadequate activity/exercise (GOAL):: No  Significant changes in sleep pattern (GOAL): No  Transportation means:: Regular car     Druze or spiritual beliefs that impact treatment:: No  Mental health DX:: No  Mental health management concern (GOAL):: No  Informal Support system:: Family, Friends, Spouse   Socioeconomic History     Marital status:      Spouse name: Not on file     Number of children: Not on file     Years of education: Not on file     Highest education level: Not on file     Tobacco Use     Smoking status: Former Smoker     Smokeless tobacco: Never Used   Substance and Sexual Activity     Alcohol use: No     Drug use: No     Sexual activity: Not Currently     Partners: Female               Resources and Interventions:  Current Resources:      Community Resources: None  Supplies used at home:: None  Equipment Currently Used at Home: none    Advance Care Plan/Directive  Advanced Care Plan/Directive Status: Not Applicable    Referrals Placed: None     Goals:         Plan: 1) Patient will continue to follow treatment plan as directed and follow up with PCP with concerns ongoing.   2) Care Coordination to remain available for future needs.     Madhu Pastor RN  Clinic Care Coordinator  [phone]

## 2020-06-28 DIAGNOSIS — T78.40XA ALLERGIC STATE, INITIAL ENCOUNTER: ICD-10-CM

## 2020-06-30 DIAGNOSIS — T78.40XA ALLERGIC STATE, INITIAL ENCOUNTER: ICD-10-CM

## 2020-06-30 RX ORDER — FLUTICASONE PROPIONATE 50 MCG
SPRAY, SUSPENSION (ML) NASAL
Qty: 16 G | Refills: 5 | Status: SHIPPED | OUTPATIENT
Start: 2020-06-30 | End: 2020-07-01

## 2020-07-01 RX ORDER — FLUTICASONE PROPIONATE 50 MCG
SPRAY, SUSPENSION (ML) NASAL
Qty: 16 G | Refills: 3 | Status: SHIPPED | OUTPATIENT
Start: 2020-07-01 | End: 2021-01-15

## 2020-09-15 ENCOUNTER — TELEPHONE (OUTPATIENT)
Dept: FAMILY MEDICINE | Facility: CLINIC | Age: 85
End: 2020-09-15

## 2020-09-15 ENCOUNTER — OFFICE VISIT (OUTPATIENT)
Dept: FAMILY MEDICINE | Facility: CLINIC | Age: 85
End: 2020-09-15
Payer: COMMERCIAL

## 2020-09-15 VITALS
TEMPERATURE: 99.5 F | BODY MASS INDEX: 27.74 KG/M2 | SYSTOLIC BLOOD PRESSURE: 118 MMHG | RESPIRATION RATE: 16 BRPM | WEIGHT: 183 LBS | OXYGEN SATURATION: 95 % | HEIGHT: 68 IN | DIASTOLIC BLOOD PRESSURE: 68 MMHG | HEART RATE: 108 BPM

## 2020-09-15 DIAGNOSIS — M70.21 OLECRANON BURSITIS OF RIGHT ELBOW: Primary | ICD-10-CM

## 2020-09-15 PROCEDURE — 99213 OFFICE O/P EST LOW 20 MIN: CPT | Performed by: NURSE PRACTITIONER

## 2020-09-15 RX ORDER — LATANOPROST 50 UG/ML
SOLUTION/ DROPS OPHTHALMIC
COMMUNITY
Start: 2020-09-09

## 2020-09-15 ASSESSMENT — MIFFLIN-ST. JEOR: SCORE: 1454.58

## 2020-09-15 NOTE — PROGRESS NOTES
"Shawn Carty is a 89 year old male who presents to clinic today for the following health issues:    HPI       Musculoskeletal problem/pain  Onset/Duration: 4  days  Description  Location: elbow - right  Joint Swelling: YES, golf ball size, fluid filled, squishy   Redness: no  Pain: no  Warmth: no  Intensity:  moderate  Progression of Symptoms:  Same, constant  Accompanying signs and symptoms:   Fevers: no  Numbness/tingling/weakness: no  History  Trauma to the area: no  Recent illness:  no  Previous similar problem: no  Previous evaluation:  no  Precipitating or alleviating factors:  Aggravating factors include: exercise and overuse  Therapies tried and outcome: tried ice without any change in swelling    Patient presents with 4 day history of Right elbow swelling. Denies any pain, fevers or other joint concerns. No history of gout.  Denies alcohol consumption. No recent infections.  He states he tried ice but has not noticed a change in the swelling.  He states if he hadn't seen it in the mirror he wouldn't even know it was swollen. He has not noticed any change in his range of motion, but does note that with more activity he has less \"tightness\" in his elbow after exercise.  No known injury per patient.    Review of Systems   CONSTITUTIONAL: NEGATIVE for fever, chills, change in weight  RESP: NEGATIVE for significant cough or SOB  CV: NEGATIVE for chest pain, palpitations or peripheral edema  MUSCULOSKELETAL: NEGATIVE for significant arthralgias or myalgia, POSITIVE  for and joint swelling in the right elbow  NEURO: NEGATIVE for any numbness or tingling in the right arm or hand.   ROS otherwise negative      Objective    /68   Pulse 108   Temp 99.5  F (37.5  C) (Tympanic)   Resp 16   Ht 1.727 m (5' 8\")   Wt 83 kg (183 lb)   SpO2 95%   BMI 27.83 kg/m    Body mass index is 27.83 kg/m .  Physical Exam   GENERAL: healthy, alert and no distress  MS: normal range of motion to bilateral upper " extremities. Moderate swelling of Right olecranon bursa. No warmth, erythema or pain on palpation. Strength equal between right and left extremities.   PSYCH: mentation appears normal, affect normal/bright        Assessment & Plan     Olecranon bursitis of right elbow  Patient given handout on home care/conservative management.  No concerns for gout or infection today due to absence of warmth, pain or redness.  Patient instructed to return to clinic if he has any persistent or worsening symptoms.      See Patient Instructions    Return if symptoms worsen or fail to improve.    Patricia Alvarado NP  [facility]

## 2020-09-15 NOTE — TELEPHONE ENCOUNTER
Swelling around elbow, feels like there is fluid in there.  Sore to the touch.  Red and warm.    Advised clinic appointment this afternoon.  Appointment made.    Josy Hurst RN

## 2020-09-15 NOTE — PATIENT INSTRUCTIONS
Patient Education     Bursitis of the Elbow (Olecranon)  Your elbow joint contains a small fluid-filled sac called a bursa. The bursa helps the muscles and tendons move smoothly over the bone. It also cushions and protects your elbow. Bursitis is when the bursa is inflamed or swollen. This is most often due to overuse of or injury to the elbow. Symptoms include swelling and pain. If the elbow is red and feels warm to the touch, the bursa itself may be infected.  In most cases, elbow bursitis resolves with medicine and self-care at home. It may take several weeks for the bursa to heal and the swelling to go away. In some cases, your healthcare provider may drain excess fluid from the bursa. Or, he or she may inject medicine directly into the bursa to help relieve symptoms. In severe cases, you may need surgery to remove the bursa may. If there is concern that the bursa is infected, your healthcare provider may prescribe antibiotics to treat the infection.    Home care  Your healthcare provider may prescribe medicine to help relieve pain and swelling. This may be an over-the-counter pain reliever or prescription pain medicine. Take all medicines as directed. To help treat or prevent infection, your provider may prescribe antibiotics. If these are prescribed, take them as directed until they are gone.  The following are general care guidelines:    Apply an ice pack or bag of frozen peas wrapped in a thin towel to your elbow for 15 to 20 minutes at a time. Do this 3 to 4 times a day until pain and swelling improve.    Keep your elbow raised above the level of your heart whenever possible. This helps reduce swelling. When sitting or lying down, place your arm on a pillow that rests on your chest or on a pillow at your side.    Use an elastic wrap around the elbow joint to compress the area while it is healing. Make the wrap snug but not tight to the point of causing pain.    Rest your elbow to give it time to heal. You  may need to wear an elbow pad to help protect and limit the movement of your elbow. During and after healing, avoid leaning on your elbows.  Follow-up care  Follow up with your healthcare provider, or as advised. If you have been referred to a specialist, make that appointment promptly.  When to seek medical advice  Call your healthcare provider right away if any of these occur:    Fever of 100.4 F (38 C) or higher, or as advised    Chills    Increased pain, swelling, warmth, redness, or drainage from the joint    Trouble moving the elbow joint    Numbness or tingling in the hand    Severe pain or swelling in forearm or hand    Loss of pink color and slow return of color after squeezing fingertip or hand  Date Last Reviewed: 6/1/2016 2000-2019 The "[employer]". [address] [place] [address], [place] [address]. All rights reserved. This information is not intended as a substitute for professional medical care. Always follow your healthcare professional's instructions.

## 2020-09-15 NOTE — TELEPHONE ENCOUNTER
Reason for call:  Patient reporting a symptom    Symptom or request: Pt has an open sore on his elbow x 2 days.  Please call patient and advise.      Duration (how long have symptoms been present): 3 days    Have you been treated for this before? No    Additional comments:     Phone Number patient can be reached at:  Home number on file [phone] (home)    Best Time:  any    Can we leave a detailed message on this number:  YES    Call taken on 9/15/2020 at 10:22 AM by Shira Flores

## 2021-01-12 DIAGNOSIS — T78.40XA ALLERGY, INITIAL ENCOUNTER: ICD-10-CM

## 2021-01-12 NOTE — TELEPHONE ENCOUNTER
"Requested Prescriptions   Pending Prescriptions Disp Refills     fluticasone (FLONASE) 50 MCG/ACT nasal spray [Pharmacy Med Name: FLUTICASONE NASAL SPRAY]  5     Sig: USE TWO SPRAYS IN EACH NOSTRIL ONCE DAILY       Nasal Allergy Protocol Passed - 1/12/2021  5:02 AM        Passed - Patient is age 12 or older        Passed - Recent (12 mo) or future (30 days) visit within the authorizing provider's specialty     Patient has had an office visit with the authorizing provider or a provider within the authorizing providers department within the previous 12 mos or has a future within next 30 days. See \"Patient Info\" tab in inbasket, or \"Choose Columns\" in Meds & Orders section of the refill encounter.              Passed - Medication is active on med list             "

## 2021-01-15 RX ORDER — FLUTICASONE PROPIONATE 50 MCG
SPRAY, SUSPENSION (ML) NASAL
Qty: 16 G | Refills: 1 | Status: SHIPPED | OUTPATIENT
Start: 2021-01-15 | End: 2021-05-20

## 2021-01-15 NOTE — TELEPHONE ENCOUNTER
Prescription approved per [facility] Refill Protocol.    Lena GARCIA RN, BSN

## 2021-05-20 ENCOUNTER — OFFICE VISIT (OUTPATIENT)
Dept: FAMILY MEDICINE | Facility: CLINIC | Age: 86
End: 2021-05-20
Payer: COMMERCIAL

## 2021-05-20 VITALS
TEMPERATURE: 98.5 F | HEIGHT: 68 IN | OXYGEN SATURATION: 95 % | RESPIRATION RATE: 16 BRPM | SYSTOLIC BLOOD PRESSURE: 134 MMHG | DIASTOLIC BLOOD PRESSURE: 76 MMHG | HEART RATE: 82 BPM | BODY MASS INDEX: 27.83 KG/M2

## 2021-05-20 DIAGNOSIS — J31.0 CHRONIC RHINITIS: ICD-10-CM

## 2021-05-20 DIAGNOSIS — R29.6 FALLS FREQUENTLY: ICD-10-CM

## 2021-05-20 DIAGNOSIS — I48.91 NEW ONSET ATRIAL FIBRILLATION (H): Primary | ICD-10-CM

## 2021-05-20 DIAGNOSIS — R09.82 POST-NASAL DRAINAGE: ICD-10-CM

## 2021-05-20 DIAGNOSIS — I49.9 IRREGULAR HEARTBEAT: ICD-10-CM

## 2021-05-20 DIAGNOSIS — I10 HYPERTENSION GOAL BP (BLOOD PRESSURE) < 140/90: ICD-10-CM

## 2021-05-20 DIAGNOSIS — E87.1 HYPONATREMIA: ICD-10-CM

## 2021-05-20 DIAGNOSIS — N18.32 STAGE 3B CHRONIC KIDNEY DISEASE (H): ICD-10-CM

## 2021-05-20 DIAGNOSIS — E78.2 MIXED HYPERLIPIDEMIA: ICD-10-CM

## 2021-05-20 DIAGNOSIS — R04.0 EPISTAXIS: ICD-10-CM

## 2021-05-20 DIAGNOSIS — R26.89 BALANCE PROBLEMS: ICD-10-CM

## 2021-05-20 DIAGNOSIS — R73.01 ELEVATED FASTING GLUCOSE: ICD-10-CM

## 2021-05-20 PROBLEM — N18.30 CHRONIC KIDNEY DISEASE, STAGE 3 (H): Status: ACTIVE | Noted: 2021-05-20

## 2021-05-20 LAB
ALBUMIN SERPL-MCNC: 3.9 G/DL (ref 3.4–5)
ALP SERPL-CCNC: 55 U/L (ref 40–150)
ALT SERPL W P-5'-P-CCNC: 26 U/L (ref 0–70)
ANION GAP SERPL CALCULATED.3IONS-SCNC: 7 MMOL/L (ref 3–14)
AST SERPL W P-5'-P-CCNC: 14 U/L (ref 0–45)
BILIRUB SERPL-MCNC: 0.9 MG/DL (ref 0.2–1.3)
BUN SERPL-MCNC: 19 MG/DL (ref 7–30)
CALCIUM SERPL-MCNC: 9.8 MG/DL (ref 8.5–10.1)
CHLORIDE SERPL-SCNC: 97 MMOL/L (ref 94–109)
CHOLEST SERPL-MCNC: 124 MG/DL
CO2 SERPL-SCNC: 25 MMOL/L (ref 20–32)
CREAT SERPL-MCNC: 1.68 MG/DL (ref 0.66–1.25)
GFR SERPL CREATININE-BSD FRML MDRD: 34 ML/MIN/{1.73_M2}
GLUCOSE SERPL-MCNC: 125 MG/DL (ref 70–99)
HBA1C MFR BLD: 6 % (ref 0–5.6)
HDLC SERPL-MCNC: 57 MG/DL
LDLC SERPL CALC-MCNC: 48 MG/DL
NONHDLC SERPL-MCNC: 67 MG/DL
POTASSIUM SERPL-SCNC: 4 MMOL/L (ref 3.4–5.3)
PROT SERPL-MCNC: 7 G/DL (ref 6.8–8.8)
SODIUM SERPL-SCNC: 129 MMOL/L (ref 133–144)
T4 FREE SERPL-MCNC: 1.31 NG/DL (ref 0.76–1.46)
TRIGL SERPL-MCNC: 95 MG/DL
TSH SERPL DL<=0.005 MIU/L-ACNC: 4.94 MU/L (ref 0.4–4)

## 2021-05-20 PROCEDURE — 84439 ASSAY OF FREE THYROXINE: CPT | Performed by: FAMILY MEDICINE

## 2021-05-20 PROCEDURE — 36415 COLL VENOUS BLD VENIPUNCTURE: CPT | Performed by: FAMILY MEDICINE

## 2021-05-20 PROCEDURE — 99215 OFFICE O/P EST HI 40 MIN: CPT | Performed by: FAMILY MEDICINE

## 2021-05-20 PROCEDURE — 80053 COMPREHEN METABOLIC PANEL: CPT | Performed by: FAMILY MEDICINE

## 2021-05-20 PROCEDURE — 93000 ELECTROCARDIOGRAM COMPLETE: CPT | Performed by: FAMILY MEDICINE

## 2021-05-20 PROCEDURE — 84443 ASSAY THYROID STIM HORMONE: CPT | Performed by: FAMILY MEDICINE

## 2021-05-20 PROCEDURE — 80061 LIPID PANEL: CPT | Performed by: FAMILY MEDICINE

## 2021-05-20 PROCEDURE — 83036 HEMOGLOBIN GLYCOSYLATED A1C: CPT | Performed by: FAMILY MEDICINE

## 2021-05-20 RX ORDER — SIMVASTATIN 20 MG
20 TABLET ORAL AT BEDTIME
Qty: 90 TABLET | Refills: 3 | Status: SHIPPED | OUTPATIENT
Start: 2021-05-20 | End: 2022-05-05

## 2021-05-20 RX ORDER — DIPHENHYDRAMINE HCL 25 MG
25 CAPSULE ORAL EVERY 6 HOURS PRN
COMMUNITY
End: 2021-09-13

## 2021-05-20 RX ORDER — AZELASTINE 1 MG/ML
1 SPRAY, METERED NASAL 2 TIMES DAILY
Qty: 30 ML | Refills: 11 | Status: SHIPPED | OUTPATIENT
Start: 2021-05-20 | End: 2021-06-16

## 2021-05-20 RX ORDER — LISINOPRIL AND HYDROCHLOROTHIAZIDE 12.5; 2 MG/1; MG/1
1 TABLET ORAL DAILY
Qty: 90 TABLET | Refills: 3 | Status: CANCELLED | OUTPATIENT
Start: 2021-05-20

## 2021-05-20 ASSESSMENT — PAIN SCALES - GENERAL: PAINLEVEL: NO PAIN (0)

## 2021-05-20 NOTE — PATIENT INSTRUCTIONS
Astelin nasal spray - 1 spray in each nostril twice daily    Increase Zyrtec to one pill twice a day    Try to avoid the benadryl/diphenhydramine - this can cause memory loss, increased falls, etc.    See the ENT (ear/nose/throat) doctor to evaluate the post nasal drainage    We're rechecking on your kidney function, sodium/electrolytes    We may need to najera your blood pressure medication - the lisinopril can cause a cough and the hydrochlorothiazide can cause low blood sodium    New onset of atrial fibrillation.    Start daily aspirin 81 mg  Have echocardiogram [phone]  See cardiologist in [place]     Our Clinic hours are:  Mondays    7:20 am - 7 pm  Tues -  Fri  7:20 am - 5 pm    Clinic Phone: [phone]    The clinic lab opens at 7:30 am Mon - Fri and appointments are required.    [facility]  Ph. [phone]  Monday  8 am - 7pm  Tues - Fri 8 am - 5:30 pm

## 2021-05-20 NOTE — PROGRESS NOTES
"    Assessment & Plan     New onset atrial fibrillation (H)  CHADS-2Vasc score is 3 giving him a 3.2% risk of stroke/year.  We discussed this at length, anticoagulation, etc  Patient is high risk of falling - uses walker, admits to falling into walls already, had a fall a year ago, etc  Recommend aspirin alone at this time.    - TSH with free T4 reflex  - Echocardiogram Complete; Future  - CARDIOLOGY EVAL ADULT REFERRAL; Future  - aspirin (ASA) 81 MG EC tablet; Take 1 tablet (81 mg) by mouth daily    Mixed hyperlipidemia     - Lipid panel reflex to direct LDL Non-fasting  - simvastatin (ZOCOR) 20 MG tablet; Take 1 tablet (20 mg) by mouth At Bedtime    Falls frequently/balance problems  Has fallen frequently, uses walker regularly  Leg weakness, would benefit from PT    Hypertension goal BP (blood pressure) < 140/90  Well controlled, however, lisinopril may be causing a chronic cough  - Comprehensive metabolic panel    Elevated fasting glucose     - Hemoglobin A1c    Stage 3b chronic kidney disease     - JUST IN CASE    Chronic rhinitis  Stop benadryl - discussed risks of this at his age  Increase zyrtec to 10 mg twice daily  See ENT   - OTOLARYNGOLOGY REFERRAL  - azelastine (ASTELIN) 0.1 % nasal spray; Spray 1 spray into both nostrils 2 times daily    Post-nasal drainage     - OTOLARYNGOLOGY REFERRAL    Hyponatremia  1 year ago and was not rechecked  Could be cause of weakness/falls, etc  If low will stop his hydrochlorothiazide first   - Comprehensive metabolic panel  - JUST IN CASE    Irregular heartbeat  New onset a fib as above  - EKG 12-lead complete w/read - Clinics             BMI:   Estimated body mass index is 27.83 kg/m  as calculated from the following:    Height as of this encounter: 1.727 m (5' 8\").    Weight as of 9/15/20: 83 kg (183 lb).           No follow-ups on file.    Maricel Whatley MD  [facility]    Lavon Escobar is a 89 year old who presents for the following " "health issues  accompanied by his daughter ricky:    HPI     New Patient/Transfer of Care  Hyperlipidemia Follow-Up      Are you regularly taking any medication or supplement to lower your cholesterol?   Yes- simvastatin    Are you having muscle aches or other side effects that you think could be caused by your cholesterol lowering medication?  No    Hypertension Follow-up      Do you check your blood pressure regularly outside of the clinic? No     Are you following a low salt diet? Yes    Are your blood pressures ever more than 140 on the top number (systolic) OR more   than 90 on the bottom number (diastolic), for example 140/90? No      How many servings of fruits and vegetables do you eat daily?  2-3    On average, how many sweetened beverages do you drink each day (Examples: soda, juice, sweet tea, etc.  Do NOT count diet or artificially sweetened beverages)?   1/2 glass of orange and cranberry juice    How many days per week do you exercise enough to make your heart beat faster? 3 or less    How many minutes a day do you exercise enough to make your heart beat faster? 9 or less    How many days per week do you miss taking your medication? 0    Chronic rinitis, taking significant amounts of benadryl daily.   Encouraged him to stop that  He tried flonase for a year and it didn't help.  Takes zyrtec daily    Review of Systems   Constitutional, HEENT, cardiovascular, pulmonary, gi and gu systems are negative, except as otherwise noted.    Frequent falls, off balance, usually uses a walker daily      Objective    /76   Pulse 82   Temp 98.5  F (36.9  C) (Tympanic)   Resp 16   Ht 1.727 m (5' 8\")   SpO2 95%   BMI 27.83 kg/m    Body mass index is 27.83 kg/m .  Physical Exam   GENERAL: healthy, alert and no distress  EYES: Eyes grossly normal to inspection, PERRL and conjunctivae and sclerae normal  NECK: no adenopathy, no asymmetry, masses, or scars and thyroid normal to palpation  RESP: lungs clear to " auscultation - no rales, rhonchi or wheezes  CV: irregularly irregular rhythm, normal S1 S2, no S3 or S4, no murmur, click or rub, peripheral pulses strong and no peripheral edema  ABDOMEN: soft, nontender, no hepatosplenomegaly, no masses and bowel sounds normal  MS: no gross musculoskeletal defects noted, no edema  NEURO: Normal strength and tone, mentation intact and speech normal  PSYCH: mentation appears normal, affect normal/bright    EKG - Reviewed and interpreted by me atrial fibrillation, rate 80s, no LVH by voltage criteria, there are no prior tracings available        51 minutes spent reviewing chart, talking with patient/dtr, reviewing previous labs/imaging

## 2021-05-20 NOTE — LETTER
May 21, 2021      Shawn Carty  [address]   [place] [facility] [address]        Dear ,    We are writing to inform you of your test results.    Blood sugar is in the pre-diabetic range.  Really nothing to worry about at this point in time.     TSH (thyroid) is mildly elevated but this is normal for age. The active Thyroid hormone is in normal range.     Sodium continues to be low, but improved from a year ago.  This is likely due to the hydrochlorothiazide in his blood pressure medication.   Discontinue lisinopril/hctz now and start Losartan 50 mg daily.  I'm hoping the change from lisinopril will reduce the chronic cough.     Keep an eye on blood pressure over the next 2 weeks and recheck blood pressure and blood work (sodium) in 2-3 weeks at clinic.  Make a lab only and nurse only appointment.     Kidney function continues to slowly decline, not surprising for age, but continue to drink plenty of water.  Avoid NSAIDS like ibuprofen and naproxen.  Tylenol/Acetaminophen is okay.     Cholesterol is excellent.     Let me know if there are questions.     Resulted Orders   Comprehensive metabolic panel   Result Value Ref Range    Sodium 129 (L) 133 - 144 mmol/L    Potassium 4.0 3.4 - 5.3 mmol/L    Chloride 97 94 - 109 mmol/L    Carbon Dioxide 25 20 - 32 mmol/L    Anion Gap 7 3 - 14 mmol/L    Glucose 125 (H) 70 - 99 mg/dL      Comment:      Non Fasting    Urea Nitrogen 19 7 - 30 mg/dL    Creatinine 1.68 (H) 0.66 - 1.25 mg/dL    GFR Estimate 34 (L) >60 mL/min/[1.73_m2]      Comment:      Non  GFR Calc  Starting 12/18/2018, serum creatinine based estimated GFR (eGFR) will be   calculated using the Chronic Kidney Disease Epidemiology Collaboration   (CKD-EPI) equation.      GFR Estimate If Black 40 (L) >60 mL/min/[1.73_m2]      Comment:       GFR Calc  Starting 12/18/2018, serum creatinine based estimated GFR (eGFR) will be   calculated using the Chronic Kidney Disease  Epidemiology Collaboration   (CKD-EPI) equation.      Calcium 9.8 8.5 - 10.1 mg/dL    Bilirubin Total 0.9 0.2 - 1.3 mg/dL    Albumin 3.9 3.4 - 5.0 g/dL    Protein Total 7.0 6.8 - 8.8 g/dL    Alkaline Phosphatase 55 40 - 150 U/L    ALT 26 0 - 70 U/L    AST 14 0 - 45 U/L   Lipid panel reflex to direct LDL Non-fasting   Result Value Ref Range    Cholesterol 124 <200 mg/dL    Triglycerides 95 <150 mg/dL      Comment:      Non Fasting    HDL Cholesterol 57 >39 mg/dL    LDL Cholesterol Calculated 48 <100 mg/dL      Comment:      Desirable:       <100 mg/dl    Non HDL Cholesterol 67 <130 mg/dL   Hemoglobin A1c   Result Value Ref Range    Hemoglobin A1C 6.0 (H) 0 - 5.6 %      Comment:      Normal <5.7% Prediabetes 5.7-6.4%  Diabetes 6.5% or higher - adopted from ADA   consensus guidelines.     TSH with free T4 reflex   Result Value Ref Range    TSH 4.94 (H) 0.40 - 4.00 mU/L   T4 free   Result Value Ref Range    T4 Free 1.31 0.76 - 1.46 ng/dL       If you have any questions or concerns, please call the clinic at the number listed above.       Sincerely,      Maricel Whatley MD

## 2021-05-21 RX ORDER — LOSARTAN POTASSIUM 50 MG/1
50 TABLET ORAL DAILY
Qty: 90 TABLET | Refills: 3 | Status: SHIPPED | OUTPATIENT
Start: 2021-05-21 | End: 2022-05-05

## 2021-05-21 NOTE — RESULT ENCOUNTER NOTE
Blood sugar is in the pre-diabetic range.  Really nothing to worry about at this point in time.    TSH (thyroid) is mildly elevated but this is normal for age. The active Thyroid hormone is in normal range.    Sodium continues to be low, but improved from a year ago.  This is likely due to the hydrochlorothiazide in his blood pressure medication.   Discontinue lisinopril/hctz now and start Losartan 50 mg daily.  I'm hoping the change from lisinopril will reduce the chronic cough.    Keep an eye on blood pressure over the next 2 weeks and recheck blood pressure and blood work (sodium) in 2-3 weeks at clinic.  Make a lab only and nurse only appointment.    Kidney function continues to slowly decline, not surprising for age, but continue to drink plenty of water.  Avoid NSAIDS like ibuprofen and naproxen.  Tylenol/Acetaminophen is okay.    Cholesterol is excellent.    Let me know if there are questions.    Maricel Whatley M.D.

## 2021-06-10 ENCOUNTER — HOSPITAL ENCOUNTER (OUTPATIENT)
Dept: CARDIOLOGY | Facility: CLINIC | Age: 86
Discharge: HOME | End: 2021-06-10
Attending: FAMILY MEDICINE | Admitting: FAMILY MEDICINE
Payer: COMMERCIAL

## 2021-06-10 DIAGNOSIS — I48.91 NEW ONSET ATRIAL FIBRILLATION (H): ICD-10-CM

## 2021-06-10 PROCEDURE — 93306 TTE W/DOPPLER COMPLETE: CPT | Mod: 26 | Performed by: INTERNAL MEDICINE

## 2021-06-10 PROCEDURE — 93306 TTE W/DOPPLER COMPLETE: CPT

## 2021-06-13 ENCOUNTER — NURSE TRIAGE (OUTPATIENT)
Dept: NURSING | Facility: CLINIC | Age: 86
End: 2021-06-13

## 2021-06-13 NOTE — TELEPHONE ENCOUNTER
Bloody nose.  Can get it to stop but only briefly.    Second day he has had a nose bleed since stopping Flonase and adding Astelin. He also had a blood pressure medication changed this same day. He's taking Losartan now.  He was also started on 81 mg aspirin daily.    I instructed him per our protocol for nosebleed.  If needed, after two tries, I advised to put a gauze pad wet with petroleum jelly (he has hypertension) in his nose and use gentle pressure again for 10 minutes.  Both patient and his wife stated understanding of instructions.  Will call back if needed.  Will let Dr Whatley know of the bloody noses.    COVID 19 Nurse Triage Plan/Patient Instructions    Please be aware that novel coronavirus (COVID-19) may be circulating in the community. If you develop symptoms such as fever, cough, or SOB or if you have concerns about the presence of another infection including coronavirus (COVID-19), please contact your health care provider or visit [email or web address].     Disposition/Instructions    Home care recommended. Follow home care protocol based instructions.    Thank you for taking steps to prevent the spread of this virus.  o Limit your contact with others.  o Wear a simple mask to cover your cough.  o Wash your hands well and often.    Resources    [facility]: About COVID-19: [email or web address]    CDC: What to Do If You're Sick: www.cdc.gov/coronavirus/2019-ncov/about/steps-when-sick.html    CDC: Ending Home Isolation: www.cdc.gov/coronavirus/2019-ncov/hcp/disposition-in-home-patients.html     CDC: Caring for Someone: www.cdc.gov/coronavirus/2019-ncov/if-you-are-sick/care-for-someone.html     [facility]: Interim Guidance for Hospital Discharge to Home: www.health.[facility].[place]/diseases/coronavirus/hcp/hospdischarge.pdf    [facility] clinical trials (COVID-19 research studies): [email or web address]     Below are the COVID-19 hotlines at the  [place] Department of Health ([facility]). Interpreters are available.   o For health questions: Call 433-631-2139 or 1-124.193.9174 (7 a.m. to 7 p.m.)  o For questions about schools and childcare: Call 330-377-4845 or 1-542.367.6184 (7 a.m. to 7 p.m.)     Reason for Disposition    [1] Mild-moderate nosebleed AND [2] bleeding stopped now    Additional Information    Negative: Fainted or too weak to stand following large blood loss    Negative: Sounds like a life-threatening emergency to the triager    Negative: [1] Bleeding present > 30 minutes AND [2] using correct method of direct pressure    Negative: [1] Bleeding now AND [2] second call after being instructed in correct technique of direct pressure    Negative: Dizziness or lightheadedness    Negative: Pale skin (pallor) of new onset or worsening    Negative: [1] Has nasal packing (inserted by health care provider to control bleeding) AND [2] new rash    Negative: [1] Has nasal packing AND [2] now bleeding around the packing (Exception: few drops or ooze)    Negative: Patient sounds very sick or weak to the triager    Negative: [1] Bleeding recurs 3 or more times in 24 hours AND [2] direct pressure applied correctly    Negative: [1] Skin bruises or bleeding gums AND [2] not caused by an injury    Negative: [1] Large amount of blood has been lost (e.g., 1 cup or 240 ml) AND [2] bleeding now controlled (stopped)    Negative: [1] Has nasal packing AND [2] fever > 100.4 F (38.0 C)    Negative: Taking Coumadin (warfarin) or other strong blood thinner, or known bleeding disorder (e.g., thrombocytopenia)    Negative: Has nasal packing (inserted by health care provider to control bleeding)    Negative: Hard-to-stop nosebleeds are a chronic symptom (recurrent or ongoing AND present > 4 weeks)    Negative: Easy bleeding present in other family members    Protocols used: ELIZABETH NORRIS RN [facility]

## 2021-06-15 ENCOUNTER — TELEPHONE (OUTPATIENT)
Dept: FAMILY MEDICINE | Facility: CLINIC | Age: 86
End: 2021-06-15

## 2021-06-15 DIAGNOSIS — R29.898 WEAKNESS OF LOWER EXTREMITY, UNSPECIFIED LATERALITY: ICD-10-CM

## 2021-06-15 DIAGNOSIS — R29.6 FALLS FREQUENTLY: Primary | ICD-10-CM

## 2021-06-15 DIAGNOSIS — R26.89 BALANCE PROBLEMS: ICD-10-CM

## 2021-06-15 NOTE — TELEPHONE ENCOUNTER
Patient's wife is calling - he needs homecare PT and maybe help with showering.  Legs are shaky, doesn't do well with his walker.  He tries.  He is mostly in a wheelchair.  Has used services in the past.  Would like referral sent to [facility].    Referral started.    Josy Hurst RN

## 2021-06-15 NOTE — TELEPHONE ENCOUNTER
Patient's wife notified of referral. Phone number also given so she can call to make an appointment if they do not hear from them in 1-2 days.  Mariia Marx RN

## 2021-06-16 ENCOUNTER — NURSE TRIAGE (OUTPATIENT)
Dept: FAMILY MEDICINE | Facility: CLINIC | Age: 86
End: 2021-06-16

## 2021-06-16 ENCOUNTER — OFFICE VISIT (OUTPATIENT)
Dept: FAMILY MEDICINE | Facility: CLINIC | Age: 86
End: 2021-06-16
Payer: COMMERCIAL

## 2021-06-16 VITALS
BODY MASS INDEX: 26.46 KG/M2 | WEIGHT: 174 LBS | SYSTOLIC BLOOD PRESSURE: 144 MMHG | HEART RATE: 103 BPM | OXYGEN SATURATION: 93 % | TEMPERATURE: 98.4 F | DIASTOLIC BLOOD PRESSURE: 62 MMHG

## 2021-06-16 DIAGNOSIS — R04.0 EPISTAXIS: ICD-10-CM

## 2021-06-16 DIAGNOSIS — E87.1 HYPONATREMIA: ICD-10-CM

## 2021-06-16 DIAGNOSIS — L81.9 PIGMENTED SKIN LESION SUSPICIOUS FOR MALIGNANT NEOPLASM: Primary | ICD-10-CM

## 2021-06-16 DIAGNOSIS — I10 HYPERTENSION GOAL BP (BLOOD PRESSURE) < 140/90: ICD-10-CM

## 2021-06-16 DIAGNOSIS — N18.32 STAGE 3B CHRONIC KIDNEY DISEASE (H): ICD-10-CM

## 2021-06-16 LAB
ANION GAP SERPL CALCULATED.3IONS-SCNC: 7 MMOL/L (ref 3–14)
BUN SERPL-MCNC: 17 MG/DL (ref 7–30)
CALCIUM SERPL-MCNC: 8.6 MG/DL (ref 8.5–10.1)
CHLORIDE SERPL-SCNC: 106 MMOL/L (ref 94–109)
CO2 SERPL-SCNC: 24 MMOL/L (ref 20–32)
CREAT SERPL-MCNC: 1.34 MG/DL (ref 0.66–1.25)
GFR SERPL CREATININE-BSD FRML MDRD: 45 ML/MIN/{1.73_M2}
GLUCOSE SERPL-MCNC: 128 MG/DL (ref 70–99)
POTASSIUM SERPL-SCNC: 3.9 MMOL/L (ref 3.4–5.3)
SODIUM SERPL-SCNC: 137 MMOL/L (ref 133–144)

## 2021-06-16 PROCEDURE — 80048 BASIC METABOLIC PNL TOTAL CA: CPT | Performed by: FAMILY MEDICINE

## 2021-06-16 PROCEDURE — 99213 OFFICE O/P EST LOW 20 MIN: CPT | Performed by: FAMILY MEDICINE

## 2021-06-16 PROCEDURE — 36415 COLL VENOUS BLD VENIPUNCTURE: CPT | Performed by: FAMILY MEDICINE

## 2021-06-16 NOTE — PATIENT INSTRUCTIONS
Patient Education     Nosebleed (Adult)    Bleeding from the nose most commonly occurs because of injury or drying and cracking of the inner lining of the nose. Most nosebleeds are because of dry air or nose-picking. They can occur during a common cold or an allergy attack. They can also occur on a very hot day, or from dry air in the winter.   If the bleeding site is found, it may be cauterized. This means it's treated to cause a blood clot to form. This may be done with a chemical, heat, or electricity. If the bleeding continues after the site is cauterized, or if the site can't be found, packing may be put in your nose. This is to apply pressure and stop the bleeding. The packing may be made of gauze or sponge. A small balloon catheter is sometimes used. These must be removed by your healthcare provider. Some types of packing dissolve on their own. In rare cases, surgery is needed to stop a nosebleed. If you are taking blood thinning (anticoagulant) medicine, you may have a blood test.   Home care    If packing was put in your nose, unless told otherwise, don't pull on it or try to remove it yourself. You will be given an appointment to have it removed. You may also have been given antibiotics to prevent a sinus infection. If so, finish all of the medicine.    Don't blow your nose for 12 hours after the bleeding stops. This will allow a strong blood clot to form. After that, if you have to blow your nose, do it very gently. Don't pick your nose. This may restart bleeding.    Don't drink alcohol or hot liquids for the next 2 days. Alcohol or hot liquids in your mouth can dilate blood vessels in your nose. This can cause bleeding to start again.    Don't take ibuprofen, naproxen, or medicines that contain aspirin. These thin the blood and may cause your nose to bleed. You may take acetaminophen for pain, unless another pain medicine was prescribed.    If the bleeding starts again, sit up and lean forward to prevent  swallowing blood. Pinch your nose tightly on both sides, as shown above, for 10 to 15 minutes. Time yourself. Don t release the pressure on your nose until 10 minutes is up. If bleeding doesn't stop, continue to pinch your nose and, if the bleeding is a small amount, call your healthcare provider. If bleeding is heavy, call 911 or return to this facility.    If you have a cold, allergies, or dry nasal membranes, lubricate the nasal passages. Gently apply a small amount of petroleum jelly inside the nose with a cotton swab twice a day (morning and night).    Don't overheat your home. This can dry the air and make your condition worse.    Put a humidifier in the room where you sleep. This will add moisture to the air. Clean the humidifier as advised by the .    Use a saline nasal spray to keep nasal passages moist.    Don't pick your nose. Keep fingernails trimmed to decrease risk of bleeds.    Don't smoke.    Follow-up care  Follow up with your healthcare provider, or as advised. Nasal packing should be rechecked or removed within 2 to 3 days.   When to get medical advice  Call your healthcare provider right away if any of these occur.     You have intermittent, recurrent, small amounts of bleeding that you can control for a while.    Fever of 100.4 F (38 C) or higher, or as directed by your healthcare provider    Headache    Sinus or facial pain  Call 911  Call 911 or get medical care right away if any of the following occur     Dizziness, weakness, or fainting    Shortness of breath or trouble breathing    You have another nosebleed that you can't control, or with heavy bleeding    You become abnormally tired or confused  [employer] last reviewed this educational content on 2/1/2020 2000-2021 [employer]. All rights reserved. This information is not intended as a substitute for professional medical care. Always follow your healthcare professional's instructions.             Put vaseline  or aquaphor in your nose daily.   Get a humidifier for whatever room you spend the most time in and/or your bedroom overnight. It's very important to keep humidifiers clean because they can spread mold and bacteria. So clean with soap and water weekly. Diluted bleach soaks weekly is also an ok method.    Call central scheduling  To see Cardiology  About your Afib  We will call to let you know about your sodium result from today.

## 2021-06-16 NOTE — TELEPHONE ENCOUNTER
"Received call from pt's wife with concerns of a nosebleed  Pt has had 3 nosebleeds in the last 2 weeks  Pt takes a baby ASA    Advised pt be seen this week  Warm transferred to central scheduling    Thank you  Marylin Meadows RN on 6/16/2021 at 10:13 AM    Reason for Disposition    Patient wants to be seen    Answer Assessment - Initial Assessment Questions  1. AMOUNT OF BLEEDING: \"How bad is the bleeding?\" \"How much blood was lost?\" \"Has the bleeding stopped?\"    - MILD: needed a couple tissues    - MODERATE: needed many tissues    - SEVERE: large blood clots, soaked many tissues, lasted more than 30 minutes       mild  2. ONSET: \"When did the nosebleed start?\"       Has had 3 in the last 1 week  3. FREQUENCY: \"How many nosebleeds have you had in the last 24 hours?\"       1  4. RECURRENT SYMPTOMS: \"Have there been other recent nosebleeds?\" If so, ask: \"How long did it take you to stop the bleeding?\" \"What worked best?\"       Pinching the nose  5. CAUSE: \"What do you think caused this nosebleed?\"      unknown  6. LOCAL FACTORS: \"Do you have any cold symptoms?\", \"Have you been rubbing or picking at your nose?\"      no  7. SYSTEMIC FACTORS: \"Do you have high blood pressure or any bleeding problems?\"      HTN  8. BLOOD THINNERS: \"Do you take any blood thinners?\" (e.g., coumadin, heparin, aspirin, Plavix)      Baby ASA  9. OTHER SYMPTOMS: \"Do you have any other symptoms?\" (e.g., lightheadedness)      no  10. PREGNANCY: \"Is there any chance you are pregnant?\" \"When was your last menstrual period?\"        NA    Protocols used: NOSEBLEED-A-OH      "

## 2021-06-16 NOTE — PROGRESS NOTES
Assessment & Plan     Pigmented skin lesion suspicious for malignant neoplasm  Personal hx of skin cancer with chronic lesions extremely suspicious for BCC  - ADULT DERMATOLOGY REFERRAL    Hypertension goal BP (blood pressure) < 140/90  - Basic metabolic panel    Stage 3b chronic kidney disease  - Basic metabolic panel    Hyponatremia  Previously ordered by their PCP but reminded them to draw today  - Basic metabolic panel    Epistaxis  No alarm signs, gets adequate hemostasis with conservative cares. See pt instr    Return if symptoms worsen or fail to improve, for Routine preventive and afib in about a month.    Patient Instructions   -- nosebleed handout given --    Put vaseline or aquaphor in your nose daily.   Get a humidifier for whatever room you spend the most time in and/or your bedroom overnight. It's very important to keep humidifiers clean because they can spread mold and bacteria. So clean with soap and water weekly. Diluted bleach soaks weekly is also an ok method.    Call central scheduling  To see Cardiology  About your Afib  We will call to let you know about your sodium result from today.      Roberta Cordova MD  [facility]            Lavon Escobar is a 89 year old who presents for the following health issues     HPI     Chief Complaint   Patient presents with     Epistaxis     x3       Concern - Epistaxis; Nose bleeds x3  Onset: 06/13/21  Description: usually bleeding from right side of nasal passage. Had difficulty getting it to stop.   Intensity: severe  Progression of Symptoms:  worsening  Accompanying Signs & Symptoms: none  Previous history of similar problem: none  Precipitating factors:        Worsened by: recently changed blood pressure medication and started low dose aspirin  Alleviating factors:        Improved by: squeezes nose up by the bony part for 15 minutes - urgent care told them to do that and it helped and worked.  Therapies tried and outcome:  "squeezes nose up by the bony part for 15 minutes    Rash  Onset/Duration: two concerning spots for more than one year. One behind right ear and one on nose  Description  Location: behind right ear and one on nose  Character: scab, purulent  Itching: no  Intensity:  Moderate over a year  Progression of Symptoms:  same  Accompanying signs and symptoms:   Fever: no  Body aches or joint pain: no  Sore throat symptoms: no  Recent cold symptoms: no  History:           Previous episodes of similar rash: None  New exposures:  None  Recent travel: no  Exposure to similar rash: no  Precipitating or alleviating factors: triple antibiotic  Therapies tried and outcome:  triple antibiotic    \"Won't heal\"  Several days of recurrence  He used to be on flonase and then was placed on azelastine but quit because he felt like it wasn't really working    Review of Systems   Constitutional, HEENT, cardiovascular, pulmonary, gi and gu systems are negative, except as otherwise noted.        Objective    BP (!) 144/62 (BP Location: Right arm, Patient Position: Sitting, Cuff Size: Adult Regular)   Pulse 103   Temp 98.4  F (36.9  C) (Tympanic)   Wt 78.9 kg (174 lb)   SpO2 93%   BMI 26.46 kg/m    Body mass index is 26.46 kg/m .  Physical Exam   GENERAL: healthy, alert and no distress  HENT: normal cephalic/atraumatic, oropharynx clear and oral mucous membranes moist - R nasal antrum with dried blood, unable to get good view of mucosa but no active bleeding. L normal.  NECK: no adenopathy, no asymmetry, masses, or scars and thyroid normal to palpation  RESP: normal respiratory effort, speaking in complete sentences  PSYCH: mentation appears normal, affect normal/bright                "

## 2021-06-17 ENCOUNTER — TELEPHONE (OUTPATIENT)
Dept: FAMILY MEDICINE | Facility: CLINIC | Age: 86
End: 2021-06-17

## 2021-06-17 ENCOUNTER — OFFICE VISIT (OUTPATIENT)
Dept: OTOLARYNGOLOGY | Facility: CLINIC | Age: 86
End: 2021-06-17
Payer: COMMERCIAL

## 2021-06-17 ENCOUNTER — TRANSFERRED RECORDS (OUTPATIENT)
Dept: HEALTH INFORMATION MANAGEMENT | Facility: CLINIC | Age: 86
End: 2021-06-17

## 2021-06-17 VITALS — SYSTOLIC BLOOD PRESSURE: 145 MMHG | DIASTOLIC BLOOD PRESSURE: 75 MMHG | HEART RATE: 80 BPM | RESPIRATION RATE: 18 BRPM

## 2021-06-17 DIAGNOSIS — J34.2 DEVIATED NASAL SEPTUM: ICD-10-CM

## 2021-06-17 DIAGNOSIS — R04.0 RECURRENT EPISTAXIS: Primary | ICD-10-CM

## 2021-06-17 PROCEDURE — 31238 NSL/SINS NDSC SRG NSL HEMRRG: CPT | Mod: RT | Performed by: OTOLARYNGOLOGY

## 2021-06-17 PROCEDURE — 99203 OFFICE O/P NEW LOW 30 MIN: CPT | Mod: 25 | Performed by: OTOLARYNGOLOGY

## 2021-06-17 NOTE — NURSING NOTE
"Initial BP (!) 145/75 (BP Location: Right arm, Patient Position: Chair, Cuff Size: Adult Regular)   Pulse 80   Resp 18  Estimated body mass index is 26.46 kg/m  as calculated from the following:    Height as of 5/20/21: 1.727 m (5' 8\").    Weight as of 6/16/21: 78.9 kg (174 lb). .    Patient is here for a bloody nose.  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

## 2021-06-17 NOTE — TELEPHONE ENCOUNTER
Wife calling back, she is getting nervous about his nosebleeds and still has clamp on his nose from ED early this am.  Was told to follow up with ENT today.  Please advise ASAP if he can be seen or if he should return to ED.  Call wife at [phone].    Josy Hurst RN

## 2021-06-17 NOTE — TELEPHONE ENCOUNTER
I spoke to Shawn today. He said that he was taken to [facility] because he could not stop his nose bleed. [facility] apparently told his wife that he lost a lot of blood. They put a clamp on his nose. He does not believe that he is bleeding currently. He is very worried that the bleeding will start again. He says that he never had nose bleeds until a few weeks ago. Now he has had multiple difficult bleeds to stop, today being the worst. His wife is very worried. Dr Almendarez was only in clinic this afternoon and his clinic has been very busy, however he said that Shawn should come in now. They agree with the plan. Kate BOLANOS Rn

## 2021-06-17 NOTE — TELEPHONE ENCOUNTER
Reason for Call:  Other appointment    Detailed comments: Patient was taken by ambulance today to the hospital for a ongoing nose bleed. He currently has a clamp on his nose. His PCP, Dr Maricel Whatley would like him to be seen ASAP by a ENT in [place]. Can you work Shawn in? Please call Shawn and also, Dr Barry MA, Becky would like to know when he is able to get in. She is at the [facility]. Please let her know.    Phone Number Patient can be reached at: Home number on file [phone] (home)    Best Time:     Can we leave a detailed message on this number? NO    Call taken on 6/17/2021 at 9:08 AM by Terri Bui

## 2021-06-17 NOTE — PROGRESS NOTES
Chief Complaint   Patient presents with     Epistaxis     History of Present Illness   Shawn Carty is a 89 year old male presents for nasal evaluation.  I am seeing this patient in consultation for epistaxis at the request of the provider Dr. Whatley.  The patient presents with approximately few week history of epistaxis. It occurs on the right side. It usually only comes out the front of the nose, but it has ran down the back of the throat at times. The bleeding has occurred approximately 4 times.  The most recent episode was fairly significant/severe.  He presented to the emergency department early this morning and they were able to control the bleeding with a nasal clamp.  They recommended follow-up in ENT clinic today.  No previous history of nasal packing.  The patient has no personal or family history of bleeding disorders. The patient takes no blood-thinning medication, he does take a daily aspirin. The patient has been on Astelin nasal spray in the past.    Past Medical History  Patient Active Problem List   Diagnosis     Hypertension goal BP (blood pressure) < 140/90     Mixed hyperlipidemia     Allergic state, initial encounter     Falls frequently     Chronic kidney disease, stage 3     Post-nasal drainage     Chronic rhinitis     New onset atrial fibrillation (H)     Current Medications     Current Outpatient Medications:      aspirin (ASA) 81 MG EC tablet, Take 1 tablet (81 mg) by mouth daily, Disp: 1 tablet, Rfl: 0     losartan (COZAAR) 50 MG tablet, Take 1 tablet (50 mg) by mouth daily, Disp: 90 tablet, Rfl: 3     multivitamin, therapeutic (THERA-VIT) TABS, Take 1 tablet by mouth daily, Disp: , Rfl:      simvastatin (ZOCOR) 20 MG tablet, Take 1 tablet (20 mg) by mouth At Bedtime, Disp: 90 tablet, Rfl: 3     cetirizine (ZYRTEC) 10 MG tablet, Take 10 mg by mouth daily, Disp: , Rfl:      diphenhydrAMINE (BENADRYL) 25 MG capsule, Take 25 mg by mouth every 6 hours as needed for itching or  allergies Takes 6 a day, Disp: , Rfl:      latanoprost (XALATAN) 0.005 % ophthalmic solution, , Disp: , Rfl:     Allergies  No Known Allergies    Social History   Social History     Socioeconomic History     Marital status:      Spouse name: None     Number of children: None     Years of education: None     Highest education level: None   Occupational History     None   Social Needs     Financial resource strain: None     Food insecurity     Worry: None     Inability: None     Transportation needs     Medical: None     Non-medical: None   Tobacco Use     Smoking status: Former Smoker     Smokeless tobacco: Never Used   Substance and Sexual Activity     Alcohol use: No     Drug use: No     Sexual activity: Not Currently     Partners: Female   Lifestyle     Physical activity     Days per week: None     Minutes per session: None     Stress: None   Relationships     Social connections     Talks on phone: None     Gets together: None     Attends Restorationist service: None     Active member of club or organization: None     Attends meetings of clubs or organizations: None     Relationship status: None     Intimate partner violence     Fear of current or ex partner: None     Emotionally abused: None     Physically abused: None     Forced sexual activity: None   Other Topics Concern     Parent/sibling w/ CABG, MI or angioplasty before 65F 55M? No   Social History Narrative     None       Family History  Family History   Problem Relation Age of Onset     Diabetes Mother      Cancer Mother      Neurologic Disorder Mother      Cancer Brother      Cancer - colorectal Brother      Prostate Cancer Brother      Diabetes Brother      Diabetes Brother      Neurologic Disorder Brother      Diabetes Sister        Review of Systems  As per HPI and PMHx, otherwise 10+ comprehensive system review is negative.    Physical Exam  BP (!) 145/75 (BP Location: Right arm, Patient Position: Chair, Cuff Size: Adult Regular)   Pulse 80    Resp 18   GENERAL: Patient is a pleasant, cooperative 89 year old male in no acute distress.  HEAD: Normocephalic, atraumatic.  Hair and scalp are normal.  EYES: Pupils are equal, round, reactive to light and accommodation.  Extraocular movements are intact.  The sclera nonicteric without injection.  The extraocular structures are normal.  EARS: Normal shape and symmetry.  No tenderness when palpating the mastoid or tragal areas bilaterally.   NOSE: Nares are patent.  Nasal mucosa is significantly dry.  The patient has a leftward caudal and anterior nasal septal deviation.  There is prominent vessels in Kiesselbach's plexus on the right-hand side and evidence of recent bleeding.  No nasal cavity masses, polyps, or mucopurulence on anterior rhinoscopy.  NEUROLOGIC: Cranial nerves II through XII are grossly intact.  Voice is strong.  Patient is House-Brackmann I/VI bilaterally.  CARDIOVASCULAR: Extremities are warm and well-perfused.  No significant peripheral edema.  RESPIRATORY: Patient has nonlabored breathing without cough, wheeze, stridor.  PSYCHIATRIC: Patient is alert and oriented.  Mood and affect appear normal.  SKIN: Warm and dry.  No scalp, face, or neck lesions noted.    Procedure: Flexible Nasal Endoscopy  Indication: Recurrent epistaxis      To rule out any evidence of a posterior source of blooding, mass, or tumor, I proceeded with flexible fiberoptic nasal endoscopy.  The bilateral nasal cavities were anesthetized and decongested with a mixture of lidocaine and neosynephrine.  The bilateral nasal cavities were then examined using flexible fiberoptic nasal endoscope.  The right nasal cavity was without masses, polyps, or mucopurulence.  There was some old blood trailing from the right maxillary sinus into the nasopharynx.  The right middle turbinate and middle meatus was normal in appearance.  The sphenoethmoid recess was clear.  The left nasal cavity was without masses, polyps, or mucopurulence.  The  left middle turbinate and middle meatus was normal in appearance.  The sphenoethmoid recess was clear.  The sinonasal mucosa appeared normal.  The nasal septum deviates significantly to the left of the anterior mid septum.  The nasopharynx had a normal appearance with normal Eustachian tube openings and fossa of Rosenmuller bilaterally.  Minimal adenoid tissue.  The scope was removed.  The patient tolerated the procedure well.    Procedure: Control simple right anterior nasal hemorrhage  Indication: Recurrent epistaxis     Phenylephrine and lidocaine was applied to the anterior septum.  Using silver nitrate, I addressed several prominent blood vessels in the right anterior Kiesselbach's plexus. Hemostasis was achieved.  Surgicel was applied.  Patient tolerated the procedure well.      Assessment and Plan    ICD-10-CM    1. Recurrent epistaxis  R04.0 NASAL ENDOSCOPY, DIAGNOSTIC     Nasal Cautery Simple Unilat   2. Deviated nasal septum  J34.2 NASAL ENDOSCOPY, DIAGNOSTIC     Nasal Cautery Simple Unilat      It was my pleasure seeing Shawn AVERY Machelle today in clinic.  The patient presents with epistaxis. This is almost certainly coming from the right anterior septum. We discussed restrictions on manipulation and picking of the nose.  Also, sleeping with the head elevated, and avoidance of strenuous activity, heavy lifting, and bending over until the septum heals. I explained using vaseline twice daily to the anterior septum, nasal saline spray 3-5 times per day, and a humidifier at the bedside at night.    We discussed using Afrin with a severe nosebleed.  I also explained applying digital pressure to the nasal tip for a minimum of 15-20 minutes to stop a nose bleed. If that doesn't stop the bleeding the patient needs to proceed to the nearest emergency department. I will see the patient back in 2-4 weeks.     Shawn to follow up with Primary Care provider regarding elevated blood pressure.    Gt Almendarez,  MD  Department of Otolarygology-Head and Neck Surgery  [facility]

## 2021-06-17 NOTE — PATIENT INSTRUCTIONS
"Epistaxis (Nosebleed) Discharge Instructions     It is normal to have a small amount of pink/blood tinged mucous oozing after cautery. Sleeping with the head elevated, avoidance of strenuous activity, heavy lifting, and bending over for 2-3 days until septum heals. You may use Vaseline/Aquaphor twice daily to the anterior septum and nasal saline spray 3-5 times daily to help with healing.      If you develop a nosebleed, you can spray Afrin (oxymetazoline nasal spray) in the side of his bleeding.  Apply pressure to the outside of the nose (over the flexible end of the nose) for 15 minutes and lean forward so that you do not swallow blood.  Hold constant, firm pressure for the entire duration without \"peeking\" to see if it has stopped as this will likely result in repeated bleeding.  If you continue to have a nose bleed or if the bleeding is very rapid or excessive, please present to the nearest emergency department emergently for medical evaluation.     In order to decrease your risk for future nosebleeds we recommend the following strategies:     1. Avoid aspirin or other similar medications, unless prescribed by your physician  2. If you are taking Coumadin or other blood thinners, we recommend tight management to avoid excessive blood thinning which can cause recurrent bleeding.  3. Avoid trauma to your nose.  This includes irritation from exploring digits (nose picking) and excessive nose blowing.  4. If you have elevated blood pressure, keeping your blood pressure controlled will decrease your risk for future bleeds.  5. Using a room humidifier at night, especially during the dry winter months will keep your nose moist and less susceptible to bleeding.  6. Use Vaseline or Aquaphor in the nose at nighttime to help with moisture.  7. For those with a history of many severe nose bleeds, it is helpful to irrigate the nose twice daily with normal saline (salt water rinses) to keep the nasal mucosa healthy.  You can " also use AYR saline gel in the nose.     Nosebleeds are very common and regularly occur in otherwise healthy people, however frequent or recurrent severe nose bleeds may be a sign of a serious underlying medical condition.  We recommend that you discuss this episode with your primary care physician so that, if warranted, further testing may be performed.     If you have questions or concerns on any instructions given to you by your provider today or if you need to schedule an appointment, you can reach us at [phone].

## 2021-06-17 NOTE — RESULT ENCOUNTER NOTE
Spoke with ENT. They are going to try to schedule an appointment with him today. They will call patient. Did speak with wife to notify her they will be calling.    Sara Rosario, [facility]

## 2021-06-17 NOTE — RESULT ENCOUNTER NOTE
Called and spoke with patient with below information.   Patient was in the ER early this morning with a nose bleed. Patient's wife said they had to call 911. They had a hard time stopping it and patient currently has a clamp on it. Wife stated they were told to get in to see ENT right away and would like to get a referral as soon as possible.     Sara Rosario, CMA

## 2021-06-17 NOTE — NURSING NOTE
This laryngoscopy scope was used on this patient.    Flexible    Olympus Flexible #[id number] Adult

## 2021-06-17 NOTE — TELEPHONE ENCOUNTER
State of care on 6/23    Reason for Call:  Home Health Care    Emily with [facility] called regarding (reason for call): Please call with verbal orders    Orders are needed for this patient.     Skilled Nursing: Start of care on June 23rd - Needs verbal order for delayed start of care, at pt's request    Pt Provider: [facility]    Phone Number Homecare Nurse can be reached at: [phone]    Can we leave a detailed message on this number? YES    Phone number patient can be reached at: Home number on file [phone] (home)    Best Time: any    Call taken on 6/17/2021 at 2:19 PM by Shira Olson

## 2021-06-17 NOTE — LETTER
6/17/2021         RE: Shawn Carty  [address]  [facility] [address]        Dear Colleague,    Thank you for referring your patient, Shawn Carty, to the [facility]. Please see a copy of my visit note below.    Chief Complaint   Patient presents with     Epistaxis     History of Present Illness   Shawn Carty is a 89 year old male presents for nasal evaluation.  I am seeing this patient in consultation for epistaxis at the request of the provider Dr. Whatley.  The patient presents with approximately few week history of epistaxis. It occurs on the right side. It usually only comes out the front of the nose, but it has ran down the back of the throat at times. The bleeding has occurred approximately 4 times.  The most recent episode was fairly significant/severe.  He presented to the emergency department early this morning and they were able to control the bleeding with a nasal clamp.  They recommended follow-up in ENT clinic today.  No previous history of nasal packing.  The patient has no personal or family history of bleeding disorders. The patient takes no blood-thinning medication, he does take a daily aspirin. The patient has been on Astelin nasal spray in the past.    Past Medical History  Patient Active Problem List   Diagnosis     Hypertension goal BP (blood pressure) < 140/90     Mixed hyperlipidemia     Allergic state, initial encounter     Falls frequently     Chronic kidney disease, stage 3     Post-nasal drainage     Chronic rhinitis     New onset atrial fibrillation (H)     Current Medications     Current Outpatient Medications:      aspirin (ASA) 81 MG EC tablet, Take 1 tablet (81 mg) by mouth daily, Disp: 1 tablet, Rfl: 0     losartan (COZAAR) 50 MG tablet, Take 1 tablet (50 mg) by mouth daily, Disp: 90 tablet, Rfl: 3     multivitamin, therapeutic (THERA-VIT) TABS, Take 1 tablet by mouth daily, Disp: , Rfl:      simvastatin (ZOCOR) 20 MG  tablet, Take 1 tablet (20 mg) by mouth At Bedtime, Disp: 90 tablet, Rfl: 3     cetirizine (ZYRTEC) 10 MG tablet, Take 10 mg by mouth daily, Disp: , Rfl:      diphenhydrAMINE (BENADRYL) 25 MG capsule, Take 25 mg by mouth every 6 hours as needed for itching or allergies Takes 6 a day, Disp: , Rfl:      latanoprost (XALATAN) 0.005 % ophthalmic solution, , Disp: , Rfl:     Allergies  No Known Allergies    Social History   Social History     Socioeconomic History     Marital status:      Spouse name: None     Number of children: None     Years of education: None     Highest education level: None   Occupational History     None   Social Needs     Financial resource strain: None     Food insecurity     Worry: None     Inability: None     Transportation needs     Medical: None     Non-medical: None   Tobacco Use     Smoking status: Former Smoker     Smokeless tobacco: Never Used   Substance and Sexual Activity     Alcohol use: No     Drug use: No     Sexual activity: Not Currently     Partners: Female   Lifestyle     Physical activity     Days per week: None     Minutes per session: None     Stress: None   Relationships     Social connections     Talks on phone: None     Gets together: None     Attends [employer] service: None     Active member of club or organization: None     Attends meetings of clubs or organizations: None     Relationship status: None     Intimate partner violence     Fear of current or ex partner: None     Emotionally abused: None     Physically abused: None     Forced sexual activity: None   Other Topics Concern     Parent/sibling w/ CABG, MI or angioplasty before 65F 55M? No   Social History Narrative     None       Family History  Family History   Problem Relation Age of Onset     Diabetes Mother      Cancer Mother      Neurologic Disorder Mother      Cancer Brother      Cancer - colorectal Brother      Prostate Cancer Brother      Diabetes Brother      Diabetes Brother      Neurologic  Disorder Brother      Diabetes Sister        Review of Systems  As per HPI and PMHx, otherwise 10+ comprehensive system review is negative.    Physical Exam  BP (!) 145/75 (BP Location: Right arm, Patient Position: Chair, Cuff Size: Adult Regular)   Pulse 80   Resp 18   GENERAL: Patient is a pleasant, cooperative 89 year old male in no acute distress.  HEAD: Normocephalic, atraumatic.  Hair and scalp are normal.  EYES: Pupils are equal, round, reactive to light and accommodation.  Extraocular movements are intact.  The sclera nonicteric without injection.  The extraocular structures are normal.  EARS: Normal shape and symmetry.  No tenderness when palpating the mastoid or tragal areas bilaterally.   NOSE: Nares are patent.  Nasal mucosa is significantly dry.  The patient has a leftward caudal and anterior nasal septal deviation.  There is prominent vessels in Kiesselbach's plexus on the right-hand side and evidence of recent bleeding.  No nasal cavity masses, polyps, or mucopurulence on anterior rhinoscopy.  NEUROLOGIC: Cranial nerves II through XII are grossly intact.  Voice is strong.  Patient is House-Brackmann I/VI bilaterally.  CARDIOVASCULAR: Extremities are warm and well-perfused.  No significant peripheral edema.  RESPIRATORY: Patient has nonlabored breathing without cough, wheeze, stridor.  PSYCHIATRIC: Patient is alert and oriented.  Mood and affect appear normal.  SKIN: Warm and dry.  No scalp, face, or neck lesions noted.    Procedure: Flexible Nasal Endoscopy  Indication: Recurrent epistaxis      To rule out any evidence of a posterior source of blooding, mass, or tumor, I proceeded with flexible fiberoptic nasal endoscopy.  The bilateral nasal cavities were anesthetized and decongested with a mixture of lidocaine and neosynephrine.  The bilateral nasal cavities were then examined using flexible fiberoptic nasal endoscope.  The right nasal cavity was without masses, polyps, or mucopurulence.  There  was some old blood trailing from the right maxillary sinus into the nasopharynx.  The right middle turbinate and middle meatus was normal in appearance.  The sphenoethmoid recess was clear.  The left nasal cavity was without masses, polyps, or mucopurulence.  The left middle turbinate and middle meatus was normal in appearance.  The sphenoethmoid recess was clear.  The sinonasal mucosa appeared normal.  The nasal septum deviates significantly to the left of the anterior mid septum.  The nasopharynx had a normal appearance with normal Eustachian tube openings and fossa of Rosenmuller bilaterally.  Minimal adenoid tissue.  The scope was removed.  The patient tolerated the procedure well.    Procedure: Control simple right anterior nasal hemorrhage  Indication: Recurrent epistaxis     Phenylephrine and lidocaine was applied to the anterior septum.  Using silver nitrate, I addressed several prominent blood vessels in the right anterior Kiesselbach's plexus. Hemostasis was achieved.  Surgicel was applied.  Patient tolerated the procedure well.      Assessment and Plan    ICD-10-CM    1. Recurrent epistaxis  R04.0 NASAL ENDOSCOPY, DIAGNOSTIC     Nasal Cautery Simple Unilat   2. Deviated nasal septum  J34.2 NASAL ENDOSCOPY, DIAGNOSTIC     Nasal Cautery Simple Unilat      It was my pleasure seeing Shawn Carty today in clinic.  The patient presents with epistaxis. This is almost certainly coming from the right anterior septum. We discussed restrictions on manipulation and picking of the nose.  Also, sleeping with the head elevated, and avoidance of strenuous activity, heavy lifting, and bending over until the septum heals. I explained using vaseline twice daily to the anterior septum, nasal saline spray 3-5 times per day, and a humidifier at the bedside at night.    We discussed using Afrin with a severe nosebleed.  I also explained applying digital pressure to the nasal tip for a minimum of 15-20 minutes to stop a  nose bleed. If that doesn't stop the bleeding the patient needs to proceed to the nearest emergency department. I will see the patient back in 2-4 weeks.     Shawn to follow up with Primary Care provider regarding elevated blood pressure.    Gt Almendarez MD  Department of Otolarygology-Head and Neck Surgery  [facility]       Again, thank you for allowing me to participate in the care of your patient.        Sincerely,        Gt Almendarez MD

## 2021-06-28 ENCOUNTER — TELEPHONE (OUTPATIENT)
Dept: FAMILY MEDICINE | Facility: CLINIC | Age: 86
End: 2021-06-28

## 2021-06-28 NOTE — TELEPHONE ENCOUNTER
Cierra MORROW from [facility] called to get verbal orders. She is requesting verbal orders for:  OT 1 x a week x 1 week, 2 x a week for 4 weeks  For lymphedema and equipment, home safety and ADL's.  OK for verbal orders per RN protocol.  Mariia Marx RN

## 2021-07-01 ENCOUNTER — TELEPHONE (OUTPATIENT)
Dept: FAMILY MEDICINE | Facility: CLINIC | Age: 86
End: 2021-07-01

## 2021-07-01 NOTE — TELEPHONE ENCOUNTER
Order signed, completed, faxed back to [facility], and copy place in daily work.    Eli Levin on 7/1/2021 at 5:39 PM

## 2021-07-01 NOTE — TELEPHONE ENCOUNTER
Reason for Call: Request for an order or referral:    Order or referral being requested: Ashlie calling from [facility] requesting home PT orders to start next week two times a week for three weeks.     Date needed: as soon as possible    Has the patient been seen by the PCP for this problem? YES    Additional comments:     Phone number Patient can be reached at:  Other phone number:  [phone]*    Best Time:  any    Can we leave a detailed message on this number?  YES    Call taken on 7/1/2021 at 12:01 PM by Alejandra Figueroa

## 2021-07-01 NOTE — TELEPHONE ENCOUNTER
Orders signed, completed, faxed back to [facility] and copy placed in daily work.    Eli Levin on 7/1/2021 at 5:43 PM

## 2021-07-02 NOTE — PROGRESS NOTES
Chief Complaint   Patient presents with     RECHECK     Nosebleeds      History of Present Illness    Shawn Carty is a 89 year old male presents for follow-up.  I evaluated the patient on 6/17/2021 with a several week history of recurrent epistaxis on the right-hand side.  We treated several vessels on the right-hand side.  The patient returns today for follow-up.    Since last seeing the patient, the patient reports a couple small nosebleeds.  He has been able to stop them with pressure and using Afrin.  The bleeding is much improved from previous.  No previous history of nasal packing.  The patient has no personal or family history of bleeding disorders. The patient takes no blood-thinning medication, he does take a daily aspirin. The patient has been on Astelin nasal spray in the past.    Past Medical History  Patient Active Problem List   Diagnosis     Hypertension goal BP (blood pressure) < 140/90     Mixed hyperlipidemia     Allergic state, initial encounter     Falls frequently     Chronic kidney disease, stage 3     Post-nasal drainage     Chronic rhinitis     New onset atrial fibrillation (H)     Current Medications     Current Outpatient Medications:      aspirin (ASA) 81 MG EC tablet, Take 1 tablet (81 mg) by mouth daily, Disp: 1 tablet, Rfl: 0     cetirizine (ZYRTEC) 10 MG tablet, Take 10 mg by mouth daily, Disp: , Rfl:      diphenhydrAMINE (BENADRYL) 25 MG capsule, Take 25 mg by mouth every 6 hours as needed for itching or allergies Takes 6 a day, Disp: , Rfl:      latanoprost (XALATAN) 0.005 % ophthalmic solution, , Disp: , Rfl:      losartan (COZAAR) 50 MG tablet, Take 1 tablet (50 mg) by mouth daily, Disp: 90 tablet, Rfl: 3     multivitamin, therapeutic (THERA-VIT) TABS, Take 1 tablet by mouth daily, Disp: , Rfl:      simvastatin (ZOCOR) 20 MG tablet, Take 1 tablet (20 mg) by mouth At Bedtime, Disp: 90 tablet, Rfl: 3    Allergies  No Known Allergies    Social History   Social History      Socioeconomic History     Marital status:      Spouse name: Not on file     Number of children: Not on file     Years of education: Not on file     Highest education level: Not on file   Occupational History     Not on file   Social Needs     Financial resource strain: Not on file     Food insecurity     Worry: Not on file     Inability: Not on file     Transportation needs     Medical: Not on file     Non-medical: Not on file   Tobacco Use     Smoking status: Former Smoker     Smokeless tobacco: Never Used   Substance and Sexual Activity     Alcohol use: No     Drug use: No     Sexual activity: Not Currently     Partners: Female   Lifestyle     Physical activity     Days per week: Not on file     Minutes per session: Not on file     Stress: Not on file   Relationships     Social connections     Talks on phone: Not on file     Gets together: Not on file     Attends [employer] service: Not on file     Active member of club or organization: Not on file     Attends meetings of clubs or organizations: Not on file     Relationship status: Not on file     Intimate partner violence     Fear of current or ex partner: Not on file     Emotionally abused: Not on file     Physically abused: Not on file     Forced sexual activity: Not on file   Other Topics Concern     Parent/sibling w/ CABG, MI or angioplasty before 65F 55M? No   Social History Narrative     Not on file       Family History  Family History   Problem Relation Age of Onset     Diabetes Mother      Cancer Mother      Neurologic Disorder Mother      Cancer Brother      Cancer - colorectal Brother      Prostate Cancer Brother      Diabetes Brother      Diabetes Brother      Neurologic Disorder Brother      Diabetes Sister        Review of Systems  As per HPI and PMHx, otherwise 10+ comprehensive system review is negative.    Physical Exam  BP (!) 147/71 (BP Location: Right arm, Patient Position: Chair, Cuff Size: Adult Regular)   Pulse 68   Resp 18    GENERAL: Patient is a pleasant, cooperative 89 year old male in no acute distress.  HEAD: Normocephalic, atraumatic.  Hair and scalp are normal.  EYES: Pupils are equal, round, reactive to light and accommodation.  Extraocular movements are intact.  The sclera nonicteric without injection.  The extraocular structures are normal.  EARS: Normal shape and symmetry.  No tenderness when palpating the mastoid or tragal areas bilaterally.   NOSE: Nares are patent.  Nasal mucosa is dry.  The patient has a leftward caudal and anterior nasal septal deviation.  The anterior nasal septum is healing well. No other prominent vessels in Kiesselbach's plexus noted. No nasal cavity masses, polyps, or mucopurulence on anterior rhinoscopy.  NEUROLOGIC: Cranial nerves II through XII are grossly intact.  Voice is strong.  Patient is House-Brackmann I/VI bilaterally.  CARDIOVASCULAR: Extremities are warm and well-perfused.  No significant peripheral edema.  RESPIRATORY: Patient has nonlabored breathing without cough, wheeze, stridor.  PSYCHIATRIC: Patient is alert and oriented.  Mood and affect appear normal.  SKIN: Warm and dry.  No scalp, face, or neck lesions noted.    Assessment and Plan    ICD-10-CM    1. Recurrent epistaxis  R04.0    2. Deviated nasal septum  J34.2       It was my pleasure seeing Shawn Carty today in clinic.  The patient healing well after his right nasal cauterization.  I did not see any other areas to cauterize today.  I would recommend observation currently.  He will continue his nasal cares.  He can use Afrin for severe bleeding.  He can hold digital pressure at the nasal tip for a minimum of 15-20 minutes to stop a nose bleed. If that doesn't stop the bleeding the patient needs to proceed to the nearest emergency department. I will see the patient back as needed for recurrent bleeding issues.     Shawn to follow up with Primary Care provider regarding elevated blood pressure.    Gt Almendarez,  MD  Department of Otolarygology-Head and Neck Surgery  [facility]

## 2021-07-06 ENCOUNTER — OFFICE VISIT (OUTPATIENT)
Dept: DERMATOLOGY | Facility: CLINIC | Age: 86
End: 2021-07-06
Payer: COMMERCIAL

## 2021-07-06 VITALS — OXYGEN SATURATION: 96 % | DIASTOLIC BLOOD PRESSURE: 62 MMHG | SYSTOLIC BLOOD PRESSURE: 124 MMHG | HEART RATE: 69 BPM

## 2021-07-06 DIAGNOSIS — D48.5 NEOPLASM OF UNCERTAIN BEHAVIOR OF SKIN: Primary | ICD-10-CM

## 2021-07-06 PROCEDURE — 99202 OFFICE O/P NEW SF 15 MIN: CPT | Mod: 25 | Performed by: PHYSICIAN ASSISTANT

## 2021-07-06 PROCEDURE — 11102 TANGNTL BX SKIN SINGLE LES: CPT | Performed by: PHYSICIAN ASSISTANT

## 2021-07-06 PROCEDURE — 11103 TANGNTL BX SKIN EA SEP/ADDL: CPT | Performed by: PHYSICIAN ASSISTANT

## 2021-07-06 PROCEDURE — 88305 TISSUE EXAM BY PATHOLOGIST: CPT | Performed by: DERMATOLOGY

## 2021-07-06 NOTE — LETTER
7/6/2021         RE: Shawn Carty  [address]  [facility] [address]        Dear Colleague,    Thank you for referring your patient, Shawn Carty, to the [facility]. Please see a copy of my visit note below.    Shawn Carty is an extremely pleasant 89 year old year old male patient here today for spot on neck and nose. Present for over a year, not healing. Will scab on right neck. Patient has no other skin complaints today.  Remainder of the HPI, Meds, PMH, Allergies, FH, and SH was reviewed in chart.    Pertinent Hx:   No personal history of skin cancer.   History reviewed. No pertinent past medical history.    Past Surgical History:   Procedure Laterality Date     SURGICAL HISTORY OF -   2008    dupuytrens contracture repair R     SURGICAL HISTORY OF -   2008    dupuytrens contracture repair L        Family History   Problem Relation Age of Onset     Diabetes Mother      Cancer Mother      Neurologic Disorder Mother      Cancer Brother      Cancer - colorectal Brother      Prostate Cancer Brother      Diabetes Brother      Diabetes Brother      Neurologic Disorder Brother      Diabetes Sister        Social History     Socioeconomic History     Marital status:      Spouse name: Not on file     Number of children: Not on file     Years of education: Not on file     Highest education level: Not on file   Occupational History     Not on file   Social Needs     Financial resource strain: Not on file     Food insecurity     Worry: Not on file     Inability: Not on file     Transportation needs     Medical: Not on file     Non-medical: Not on file   Tobacco Use     Smoking status: Former Smoker     Smokeless tobacco: Never Used   Substance and Sexual Activity     Alcohol use: No     Drug use: No     Sexual activity: Not Currently     Partners: Female   Lifestyle     Physical activity     Days per week: Not on file     Minutes per session: Not on file      Stress: Not on file   Relationships     Social connections     Talks on phone: Not on file     Gets together: Not on file     Attends [employer] service: Not on file     Active member of club or organization: Not on file     Attends meetings of clubs or organizations: Not on file     Relationship status: Not on file     Intimate partner violence     Fear of current or ex partner: Not on file     Emotionally abused: Not on file     Physically abused: Not on file     Forced sexual activity: Not on file   Other Topics Concern     Parent/sibling w/ CABG, MI or angioplasty before 65F 55M? No   Social History Narrative     Not on file       Outpatient Encounter Medications as of 7/6/2021   Medication Sig Dispense Refill     aspirin (ASA) 81 MG EC tablet Take 1 tablet (81 mg) by mouth daily 1 tablet 0     cetirizine (ZYRTEC) 10 MG tablet Take 10 mg by mouth daily       diphenhydrAMINE (BENADRYL) 25 MG capsule Take 25 mg by mouth every 6 hours as needed for itching or allergies Takes 6 a day       latanoprost (XALATAN) 0.005 % ophthalmic solution        losartan (COZAAR) 50 MG tablet Take 1 tablet (50 mg) by mouth daily 90 tablet 3     multivitamin, therapeutic (THERA-VIT) TABS Take 1 tablet by mouth daily       simvastatin (ZOCOR) 20 MG tablet Take 1 tablet (20 mg) by mouth At Bedtime 90 tablet 3     No facility-administered encounter medications on file as of 7/6/2021.              O:   NAD, WDWN, Alert & Oriented, Mood & Affect wnl, Vitals stable   Here today with his daughter    /62 (BP Location: Left arm)   Pulse 69   SpO2 96%    General appearance normal   Vitals stable   Alert, oriented and in no acute distress     2.0 cm pink pearly plaque on right lateral neck  0.7 cm pink pearly papule on right nasal sidewall   0.7 cm pink pearly papule on left mid cheek          Eyes: Conjunctivae/lids:Normal     ENT: Lips: normal    MSK:Normal    Pulm: Breathing Normal     Neuro/Psych: Orientation:Alert and Orientedx3 ;  Mood/Affect:normal   A/P:  1. R/O BCC right lateral neck, right nasal sidewall, and left mid cheek  BENIGN LESIONS DISCUSSED WITH PATIENT:  I discussed the specifics of tumor, prognosis, and genetics of benign lesions.  I explained that treatment of these lesions would be purely cosmetic and not medically neccessary.  I discussed with patient different removal options including excision, cautery and /or laser.      Nature and genetics of benign skin lesions dicussed with patient.  Signs and Symptoms of skin cancer discussed with patient.  ABCDEs of melanoma reviewed with patient.  Patient encouraged to perform monthly skin exams.  UV precautions reviewed with patient.  Risks of non-melanoma skin cancer discussed with patient   Return to clinic pending biopsy results.         Again, thank you for allowing me to participate in the care of your patient.        Sincerely,        Adriane Almaraz PA-C

## 2021-07-06 NOTE — PROGRESS NOTES
Shawn Carty is an extremely pleasant 89 year old year old male patient here today for spot on neck and nose. Present for over a year, not healing. Will scab on right neck. Patient has no other skin complaints today.  Remainder of the HPI, Meds, PMH, Allergies, FH, and SH was reviewed in chart.    Pertinent Hx:   No personal history of skin cancer.   History reviewed. No pertinent past medical history.    Past Surgical History:   Procedure Laterality Date     SURGICAL HISTORY OF -   2008    dupuytrens contracture repair R     SURGICAL HISTORY OF -   2008    dupuytrens contracture repair L        Family History   Problem Relation Age of Onset     Diabetes Mother      Cancer Mother      Neurologic Disorder Mother      Cancer Brother      Cancer - colorectal Brother      Prostate Cancer Brother      Diabetes Brother      Diabetes Brother      Neurologic Disorder Brother      Diabetes Sister        Social History     Socioeconomic History     Marital status:      Spouse name: Not on file     Number of children: Not on file     Years of education: Not on file     Highest education level: Not on file   Occupational History     Not on file   Social Needs     Financial resource strain: Not on file     Food insecurity     Worry: Not on file     Inability: Not on file     Transportation needs     Medical: Not on file     Non-medical: Not on file   Tobacco Use     Smoking status: Former Smoker     Smokeless tobacco: Never Used   Substance and Sexual Activity     Alcohol use: No     Drug use: No     Sexual activity: Not Currently     Partners: Female   Lifestyle     Physical activity     Days per week: Not on file     Minutes per session: Not on file     Stress: Not on file   Relationships     Social connections     Talks on phone: Not on file     Gets together: Not on file     Attends Bahai service: Not on file     Active member of club or organization: Not on file     Attends meetings of clubs or  organizations: Not on file     Relationship status: Not on file     Intimate partner violence     Fear of current or ex partner: Not on file     Emotionally abused: Not on file     Physically abused: Not on file     Forced sexual activity: Not on file   Other Topics Concern     Parent/sibling w/ CABG, MI or angioplasty before 65F 55M? No   Social History Narrative     Not on file       Outpatient Encounter Medications as of 7/6/2021   Medication Sig Dispense Refill     aspirin (ASA) 81 MG EC tablet Take 1 tablet (81 mg) by mouth daily 1 tablet 0     cetirizine (ZYRTEC) 10 MG tablet Take 10 mg by mouth daily       diphenhydrAMINE (BENADRYL) 25 MG capsule Take 25 mg by mouth every 6 hours as needed for itching or allergies Takes 6 a day       latanoprost (XALATAN) 0.005 % ophthalmic solution        losartan (COZAAR) 50 MG tablet Take 1 tablet (50 mg) by mouth daily 90 tablet 3     multivitamin, therapeutic (THERA-VIT) TABS Take 1 tablet by mouth daily       simvastatin (ZOCOR) 20 MG tablet Take 1 tablet (20 mg) by mouth At Bedtime 90 tablet 3     No facility-administered encounter medications on file as of 7/6/2021.              O:   NAD, WDWN, Alert & Oriented, Mood & Affect wnl, Vitals stable   Here today with his daughter    /62 (BP Location: Left arm)   Pulse 69   SpO2 96%    General appearance normal   Vitals stable   Alert, oriented and in no acute distress     2.0 cm pink pearly plaque on right lateral neck  0.7 cm pink pearly papule on right nasal sidewall   0.7 cm pink pearly papule on left mid cheek          Eyes: Conjunctivae/lids:Normal     ENT: Lips: normal    MSK:Normal    Pulm: Breathing Normal     Neuro/Psych: Orientation:Alert and Orientedx3 ; Mood/Affect:normal   A/P:  1. R/O BCC right lateral neck, right nasal sidewall, and left mid cheek  BENIGN LESIONS DISCUSSED WITH PATIENT:  I discussed the specifics of tumor, prognosis, and genetics of benign lesions.  I explained that treatment of  these lesions would be purely cosmetic and not medically neccessary.  I discussed with patient different removal options including excision, cautery and /or laser.      Nature and genetics of benign skin lesions dicussed with patient.  Signs and Symptoms of skin cancer discussed with patient.  ABCDEs of melanoma reviewed with patient.  Patient encouraged to perform monthly skin exams.  UV precautions reviewed with patient.  Risks of non-melanoma skin cancer discussed with patient   Return to clinic pending biopsy results.

## 2021-07-06 NOTE — PATIENT INSTRUCTIONS
Wound Care Instructions     FOR SUPERFICIAL WOUNDS     [facility] [phone]    [facility] [phone]         Nose , neck and cheek    AFTER 24 HOURS YOU SHOULD REMOVE THE BANDAGE AND BEGIN DAILY DRESSING CHANGES AS FOLLOWS:     1) Remove Dressing.     2) Clean and dry the area with tap water using a Q-tip or sterile gauze pad.     3) Apply Vaseline, Aquaphor, Polysporin ointment or Bacitracin ointment over entire wound.  Do NOT use Neosporin ointment.     4) Cover the wound with a band-aid, or a sterile non-stick gauze pad and micropore paper tape      REPEAT THESE INSTRUCTIONS AT LEAST ONCE A DAY UNTIL THE WOUND HAS COMPLETELY HEALED.    It is an old wives tale that a wound heals better when it is exposed to air and allowed to dry out. The wound will heal faster with a better cosmetic result if it is kept moist with ointment and covered with a bandage.    **Do not let the wound dry out.**      Supplies Needed:      *Cotton tipped applicators (Q-tips)    *Polysporin Ointment or Bacitracin Ointment (NOT NEOSPORIN)    *Band-aids or non-stick gauze pads and micropore paper tape.      PATIENT INFORMATION:    During the healing process you will notice a number of changes. All wounds develop a small halo of redness surrounding the wound.  This means healing is occurring. Severe itching with extensive redness usually indicates sensitivity to the ointment or bandage tape used to dress the wound.  You should call our office if this develops.      Swelling  and/or discoloration around your surgical site is common, particularly when performed around the eye.    All wounds normally drain.  The larger the wound the more drainage there will be.  After 7-10 days, you will notice the wound beginning to shrink and new skin will begin to grow.  The wound is healed when you can see skin has formed over the entire area.  A healed wound has a healthy, shiny look to the surface and is red to dark pink in  color to normalize.  Wounds may take approximately 4-6 weeks to heal.  Larger wounds may take 6-8 weeks.  After the wound is healed you may discontinue dressing changes.    You may experience a sensation of tightness as your wound heals. This is normal and will gradually subside.    Your healed wound may be sensitive to temperature changes. This sensitivity improves with time, but if you re having a lot of discomfort, try to avoid temperature extremes.    Patients frequently experience itching after their wound appears to have healed because of the continue healing under the skin.  Plain Vaseline will help relieve the itching.        POSSIBLE COMPLICATIONS    BLEEDIN. Leave the bandage in place.  2. Use tightly rolled up gauze or a cloth to apply direct pressure over the bandage for 30  minutes.  3. Reapply pressure for an additional 30 minutes if necessary  4. Use additional gauze and tape to maintain pressure once the bleeding has stopped.

## 2021-07-08 ENCOUNTER — OFFICE VISIT (OUTPATIENT)
Dept: OTOLARYNGOLOGY | Facility: CLINIC | Age: 86
End: 2021-07-08
Payer: COMMERCIAL

## 2021-07-08 VITALS — SYSTOLIC BLOOD PRESSURE: 147 MMHG | DIASTOLIC BLOOD PRESSURE: 71 MMHG | HEART RATE: 68 BPM | RESPIRATION RATE: 18 BRPM

## 2021-07-08 DIAGNOSIS — R04.0 RECURRENT EPISTAXIS: Primary | ICD-10-CM

## 2021-07-08 DIAGNOSIS — J34.2 DEVIATED NASAL SEPTUM: ICD-10-CM

## 2021-07-08 LAB — COPATH REPORT: NORMAL

## 2021-07-08 PROCEDURE — 99213 OFFICE O/P EST LOW 20 MIN: CPT | Performed by: OTOLARYNGOLOGY

## 2021-07-08 NOTE — PATIENT INSTRUCTIONS
Per physician instructions.    If you have questions or concerns on any instructions given to you by your provider today or if you need to schedule an appointment, you can reach us at [phone].  Listen to the menu for the Specialty Clinic option.      Thank you!

## 2021-07-08 NOTE — NURSING NOTE
"Chief Complaint   Patient presents with     RECHECK     Nosebleeds        Initial BP (!) 147/71 (BP Location: Right arm, Patient Position: Chair, Cuff Size: Adult Regular)   Pulse 68   Resp 18  Estimated body mass index is 26.46 kg/m  as calculated from the following:    Height as of 5/20/21: 1.727 m (5' 8\").    Weight as of 6/16/21: 78.9 kg (174 lb).  BP completed using cuff size: regular   Medications and allergies reviewed.      Sheila MYERS CMA     "

## 2021-07-08 NOTE — LETTER
7/8/2021         RE: Shawn Carty  [address]  [facility] [address]        Dear Colleague,    Thank you for referring your patient, Shawn Carty, to the [facility]. Please see a copy of my visit note below.    Chief Complaint   Patient presents with     RECHECK     Nosebleeds      History of Present Illness    Shawn Carty is a 89 year old male presents for follow-up.  I evaluated the patient on 6/17/2021 with a several week history of recurrent epistaxis on the right-hand side.  We treated several vessels on the right-hand side.  The patient returns today for follow-up.    Since last seeing the patient, the patient reports a couple small nosebleeds.  He has been able to stop them with pressure and using Afrin.  The bleeding is much improved from previous.  No previous history of nasal packing.  The patient has no personal or family history of bleeding disorders. The patient takes no blood-thinning medication, he does take a daily aspirin. The patient has been on Astelin nasal spray in the past.    Past Medical History  Patient Active Problem List   Diagnosis     Hypertension goal BP (blood pressure) < 140/90     Mixed hyperlipidemia     Allergic state, initial encounter     Falls frequently     Chronic kidney disease, stage 3     Post-nasal drainage     Chronic rhinitis     New onset atrial fibrillation (H)     Current Medications     Current Outpatient Medications:      aspirin (ASA) 81 MG EC tablet, Take 1 tablet (81 mg) by mouth daily, Disp: 1 tablet, Rfl: 0     cetirizine (ZYRTEC) 10 MG tablet, Take 10 mg by mouth daily, Disp: , Rfl:      diphenhydrAMINE (BENADRYL) 25 MG capsule, Take 25 mg by mouth every 6 hours as needed for itching or allergies Takes 6 a day, Disp: , Rfl:      latanoprost (XALATAN) 0.005 % ophthalmic solution, , Disp: , Rfl:      losartan (COZAAR) 50 MG tablet, Take 1 tablet (50 mg) by mouth daily, Disp: 90 tablet, Rfl: 3      multivitamin, therapeutic (THERA-VIT) TABS, Take 1 tablet by mouth daily, Disp: , Rfl:      simvastatin (ZOCOR) 20 MG tablet, Take 1 tablet (20 mg) by mouth At Bedtime, Disp: 90 tablet, Rfl: 3    Allergies  No Known Allergies    Social History   Social History     Socioeconomic History     Marital status:      Spouse name: Not on file     Number of children: Not on file     Years of education: Not on file     Highest education level: Not on file   Occupational History     Not on file   Social Needs     Financial resource strain: Not on file     Food insecurity     Worry: Not on file     Inability: Not on file     Transportation needs     Medical: Not on file     Non-medical: Not on file   Tobacco Use     Smoking status: Former Smoker     Smokeless tobacco: Never Used   Substance and Sexual Activity     Alcohol use: No     Drug use: No     Sexual activity: Not Currently     Partners: Female   Lifestyle     Physical activity     Days per week: Not on file     Minutes per session: Not on file     Stress: Not on file   Relationships     Social connections     Talks on phone: Not on file     Gets together: Not on file     Attends Scientologist service: Not on file     Active member of club or organization: Not on file     Attends meetings of clubs or organizations: Not on file     Relationship status: Not on file     Intimate partner violence     Fear of current or ex partner: Not on file     Emotionally abused: Not on file     Physically abused: Not on file     Forced sexual activity: Not on file   Other Topics Concern     Parent/sibling w/ CABG, MI or angioplasty before 65F 55M? No   Social History Narrative     Not on file       Family History  Family History   Problem Relation Age of Onset     Diabetes Mother      Cancer Mother      Neurologic Disorder Mother      Cancer Brother      Cancer - colorectal Brother      Prostate Cancer Brother      Diabetes Brother      Diabetes Brother      Neurologic Disorder  Brother      Diabetes Sister        Review of Systems  As per HPI and PMHx, otherwise 10+ comprehensive system review is negative.    Physical Exam  BP (!) 147/71 (BP Location: Right arm, Patient Position: Chair, Cuff Size: Adult Regular)   Pulse 68   Resp 18   GENERAL: Patient is a pleasant, cooperative 89 year old male in no acute distress.  HEAD: Normocephalic, atraumatic.  Hair and scalp are normal.  EYES: Pupils are equal, round, reactive to light and accommodation.  Extraocular movements are intact.  The sclera nonicteric without injection.  The extraocular structures are normal.  EARS: Normal shape and symmetry.  No tenderness when palpating the mastoid or tragal areas bilaterally.   NOSE: Nares are patent.  Nasal mucosa is dry.  The patient has a leftward caudal and anterior nasal septal deviation.  The anterior nasal septum is healing well. No other prominent vessels in Kiesselbach's plexus noted. No nasal cavity masses, polyps, or mucopurulence on anterior rhinoscopy.  NEUROLOGIC: Cranial nerves II through XII are grossly intact.  Voice is strong.  Patient is House-Brackmann I/VI bilaterally.  CARDIOVASCULAR: Extremities are warm and well-perfused.  No significant peripheral edema.  RESPIRATORY: Patient has nonlabored breathing without cough, wheeze, stridor.  PSYCHIATRIC: Patient is alert and oriented.  Mood and affect appear normal.  SKIN: Warm and dry.  No scalp, face, or neck lesions noted.    Assessment and Plan    ICD-10-CM    1. Recurrent epistaxis  R04.0    2. Deviated nasal septum  J34.2       It was my pleasure seeing Shawn Carty today in clinic.  The patient healing well after his right nasal cauterization.  I did not see any other areas to cauterize today.  I would recommend observation currently.  He will continue his nasal cares.  He can use Afrin for severe bleeding.  He can hold digital pressure at the nasal tip for a minimum of 15-20 minutes to stop a nose bleed. If that doesn't  stop the bleeding the patient needs to proceed to the nearest emergency department. I will see the patient back as needed for recurrent bleeding issues.     Shawn to follow up with Primary Care provider regarding elevated blood pressure.    Gt Almendarez MD  Department of Otolarygology-Head and Neck Surgery  [facility]         Again, thank you for allowing me to participate in the care of your patient.        Sincerely,        Gt Almendarez MD

## 2021-07-20 ENCOUNTER — MEDICAL CORRESPONDENCE (OUTPATIENT)
Dept: HEALTH INFORMATION MANAGEMENT | Facility: CLINIC | Age: 86
End: 2021-07-20

## 2021-07-28 ENCOUNTER — OFFICE VISIT (OUTPATIENT)
Dept: DERMATOLOGY | Facility: CLINIC | Age: 86
End: 2021-07-28
Payer: COMMERCIAL

## 2021-07-28 VITALS — DIASTOLIC BLOOD PRESSURE: 69 MMHG | HEART RATE: 78 BPM | OXYGEN SATURATION: 95 % | SYSTOLIC BLOOD PRESSURE: 128 MMHG

## 2021-07-28 DIAGNOSIS — C44.41 BASAL CELL CARCINOMA (BCC) OF NECK: Primary | ICD-10-CM

## 2021-07-28 PROCEDURE — 99207 PR NO CHARGE LOS: CPT | Performed by: DERMATOLOGY

## 2021-07-28 PROCEDURE — 12041 INTMD RPR N-HF/GENIT 2.5CM/<: CPT | Performed by: DERMATOLOGY

## 2021-07-28 PROCEDURE — 17311 MOHS 1 STAGE H/N/HF/G: CPT | Performed by: DERMATOLOGY

## 2021-07-28 NOTE — NURSING NOTE
Chief Complaint   Patient presents with     Derm Problem     mohs       Vitals:    07/28/21 0812   BP: 128/69   Pulse: 78   SpO2: 95%     Wt Readings from Last 1 Encounters:   06/16/21 78.9 kg (174 lb)       Astrid De Los Santos LPN.................7/28/2021

## 2021-07-28 NOTE — PATIENT INSTRUCTIONS
Sutured Wound Care     [facility]: [phone]    [facility]: [phone]    Right lateral neck  ? No strenuous activity for 48 hours. Resume moderate activity in 48 hours. No heavy exercising until you are seen for follow up in one week.     ? Take Tylenol as needed for discomfort.                         ? Do not drink alcoholic beverages for 48 hours.     ? Keep the pressure bandage in place for 24 hours. If the bandage becomes blood tinged or loose, reinforce it with gauze and tape.        (Refer to the reverse side of this page for management of bleeding).    ? Remove pressure bandage in 24 hours     ? Leave the flat bandage in place until your follow up appointment.    ? Keep the bandage dry. Wash around it carefully.    ? If the tape becomes soiled or starts to come off, reinforce it with additional paper tape.    ? Do not smoke for 3 weeks; smoking is detrimental to wound healing.    ? It is normal to have swelling and bruising around the surgical site. The bruising will fade in approximately 10-14 days. Elevate the area to reduce swelling.    ? Numbness, itchiness and sensitivity to temperature changes can occur after surgery and may take up to 18 months to normalize.      POSSIBLE COMPLICATIONS    BLEEDIN. Leave the bandage in place.  2. Use tightly rolled up gauze or a cloth to apply direct pressure over the bandage for 20   minutes.  3. Reapply pressure for an additional 20 minutes if necessary  4. Call the office or go to the nearest emergency room if pressure fails to stop the bleeding.  5. Use additional gauze and tape to maintain pressure once the bleeding has stopped.    PAIN:    1. Post operative pain should slowly get better, never worse.  2. A severe increase in pain may indicate a problem. Call the office if this occurs.    In case of emergency phone:Dr Gauthier [phone]

## 2021-07-28 NOTE — PROGRESS NOTES
Shawn Carty is an extremely pleasant 89 year old year old male patient here today for evaluation and managment of basal cell carcinoma on right neck.  Patient has no other skin complaints today.  Remainder of the HPI, Meds, PMH, Allergies, FH, and SH was reviewed in chart.      Past Medical History:   Diagnosis Date     Basal cell carcinoma        Past Surgical History:   Procedure Laterality Date     SURGICAL HISTORY OF -   2008    dupuytrens contracture repair R     SURGICAL HISTORY OF -   2008    dupuytrens contracture repair L        Family History   Problem Relation Age of Onset     Diabetes Mother      Cancer Mother      Neurologic Disorder Mother      Cancer Brother      Cancer - colorectal Brother      Prostate Cancer Brother      Diabetes Brother      Diabetes Brother      Neurologic Disorder Brother      Diabetes Sister        Social History     Socioeconomic History     Marital status:      Spouse name: Not on file     Number of children: Not on file     Years of education: Not on file     Highest education level: Not on file   Occupational History     Not on file   Tobacco Use     Smoking status: Former Smoker     Smokeless tobacco: Never Used   Substance and Sexual Activity     Alcohol use: No     Drug use: No     Sexual activity: Not Currently     Partners: Female   Other Topics Concern     Parent/sibling w/ CABG, MI or angioplasty before 65F 55M? No   Social History Narrative     Not on file     Social Determinants of Health     Financial Resource Strain:      Difficulty of Paying Living Expenses:    Food Insecurity:      Worried About Running Out of Food in the Last Year:      Ran Out of Food in the Last Year:    Transportation Needs:      Lack of Transportation (Medical):      Lack of Transportation (Non-Medical):    Physical Activity:      Days of Exercise per Week:      Minutes of Exercise per Session:    Stress:      Feeling of Stress :    Social Connections:      Frequency of  Communication with Friends and Family:      Frequency of Social Gatherings with Friends and Family:      Attends Mormonism Services:      Active Member of Clubs or Organizations:      Attends Club or Organization Meetings:      Marital Status:    Intimate Partner Violence:      Fear of Current or Ex-Partner:      Emotionally Abused:      Physically Abused:      Sexually Abused:        Outpatient Encounter Medications as of 7/28/2021   Medication Sig Dispense Refill     aspirin (ASA) 81 MG EC tablet Take 1 tablet (81 mg) by mouth daily 1 tablet 0     cetirizine (ZYRTEC) 10 MG tablet Take 10 mg by mouth daily       diphenhydrAMINE (BENADRYL) 25 MG capsule Take 25 mg by mouth every 6 hours as needed for itching or allergies Takes 6 a day       latanoprost (XALATAN) 0.005 % ophthalmic solution        losartan (COZAAR) 50 MG tablet Take 1 tablet (50 mg) by mouth daily 90 tablet 3     multivitamin, therapeutic (THERA-VIT) TABS Take 1 tablet by mouth daily       simvastatin (ZOCOR) 20 MG tablet Take 1 tablet (20 mg) by mouth At Bedtime 90 tablet 3     No facility-administered encounter medications on file as of 7/28/2021.             O:   NAD, WDWN, Alert & Oriented, Mood & Affect wnl, Vitals stable   Here today alone   /69   Pulse 78   SpO2 95%    General appearance normal   Vitals stable   Alert, oriented and in no acute distress     R neck ulcerated 2cm red plaque  Eyes: Conjunctivae/lids:Normal     ENT: Lips, buccal mucosa, tongue: normal    MSK:Normal    Cardiovascular: peripheral edema none    Pulm: Breathing Normal    Neuro/Psych: Orientation:Alert and Orientedx3 ; Mood/Affect:normal       A/P:  1. R neck basal cell carcinoma   MOHS:   Location    The rationale for Mohs surgery was discussed with the patient and consent was obtained.  The risks and benefits as well as alternatives to therapy were discussed, in detail.  Specifically, the risks of infection, scarring, bleeding, prolonged wound healing,  incomplete removal, allergy to anesthesia, nerve injury and recurrence were addressed.  Indication for Mohs was Location. Prior to the procedure, the treatment site was clearly identified and, if available, confirmed with previous photos and confirmed by the patient   All components of the Universal Protocol/PAUSE rule were completed.  The Mohs surgeon operated in two distinct and integrated capacities as the surgeon and pathologist.      The area was prepped with Betasept.  A rim of normal appearing skin was marked circumferentially around the lesion.  The area was infiltrated with local anesthesia.  The tumor was first debulked to remove all clinically apparent tumor.  An incision following the standard Mohs approach was done and the specimen was oriented,mapped and placed in 1 block(s).  Each specimen was then chromacoded and processed in the Mohs laboratory using standard Mohs technique and submitted for frozen section histology.  Frozen section analysis showed no residual tumor but CLEAR MARGINS.      The tumor was excised using standard Mohs technique in 1 stages(s).  CLEAR MARGINS OBTAINED and Final defect size was 2.7 x 2.5 cm.     We discussed the options for wound management in full with the patient including risks/benefits/ possible outcomes.        REPAIR INT:Because of the size and full thickness nature of the defect, a complex closure was planned. After LEC anesthesia and prep, Burow's triangles were excised in the relaxed skin tension lines. The wound edges were widely undermined by dissection in the subcutaneous plane until adequate tissue mobility was obtained. Hemostasis was obtained. The wound edges were closed in a layered fashion using Vicryl and Fast Absorbing Plain Gut sutures. Postoperative length was 5 cm.   EBL minimal; complications none; wound care routine.  The patient was discharged in good condition and will return in one week for wound evaluation.  It was a pleasure speaking to  Shawn Carty today.  Previous clinic notes and pertinent laboratory tests were reviewed prior to Shawn Carty's visit.  Signs and Symptoms of skin cancer discussed with patient.  Patient encouraged to perform monthly skin exams.  UV precautions reviewed with patient.  Risks of non-melanoma skin cancer discussed with patient   Return to clinic next appt

## 2021-07-28 NOTE — LETTER
7/28/2021         RE: Shawn Carty  [address]  [facility] [address]        Dear Colleague,    Thank you for referring your patient, Shawn Carty, to the [facility]. Please see a copy of my visit note below.    Surgical Office Location :   [facility]  [facility] [place] [address]      Shawn Carty is an extremely pleasant 89 year old year old male patient here today for evaluation and managment of basal cell carcinoma on right neck.  Patient has no other skin complaints today.  Remainder of the HPI, Meds, PMH, Allergies, FH, and SH was reviewed in chart.      Past Medical History:   Diagnosis Date     Basal cell carcinoma        Past Surgical History:   Procedure Laterality Date     SURGICAL HISTORY OF -   2008    dupuytrens contracture repair R     SURGICAL HISTORY OF -   2008    dupuytrens contracture repair L        Family History   Problem Relation Age of Onset     Diabetes Mother      Cancer Mother      Neurologic Disorder Mother      Cancer Brother      Cancer - colorectal Brother      Prostate Cancer Brother      Diabetes Brother      Diabetes Brother      Neurologic Disorder Brother      Diabetes Sister        Social History     Socioeconomic History     Marital status:      Spouse name: Not on file     Number of children: Not on file     Years of education: Not on file     Highest education level: Not on file   Occupational History     Not on file   Tobacco Use     Smoking status: Former Smoker     Smokeless tobacco: Never Used   Substance and Sexual Activity     Alcohol use: No     Drug use: No     Sexual activity: Not Currently     Partners: Female   Other Topics Concern     Parent/sibling w/ CABG, MI or angioplasty before 65F 55M? No   Social History Narrative     Not on file     Social Determinants of Health     Financial Resource Strain:      Difficulty of Paying Living Expenses:    Food Insecurity:       Worried About Running Out of Food in the Last Year:      Ran Out of Food in the Last Year:    Transportation Needs:      Lack of Transportation (Medical):      Lack of Transportation (Non-Medical):    Physical Activity:      Days of Exercise per Week:      Minutes of Exercise per Session:    Stress:      Feeling of Stress :    Social Connections:      Frequency of Communication with Friends and Family:      Frequency of Social Gatherings with Friends and Family:      Attends Jewish Services:      Active Member of Clubs or Organizations:      Attends Club or Organization Meetings:      Marital Status:    Intimate Partner Violence:      Fear of Current or Ex-Partner:      Emotionally Abused:      Physically Abused:      Sexually Abused:        Outpatient Encounter Medications as of 7/28/2021   Medication Sig Dispense Refill     aspirin (ASA) 81 MG EC tablet Take 1 tablet (81 mg) by mouth daily 1 tablet 0     cetirizine (ZYRTEC) 10 MG tablet Take 10 mg by mouth daily       diphenhydrAMINE (BENADRYL) 25 MG capsule Take 25 mg by mouth every 6 hours as needed for itching or allergies Takes 6 a day       latanoprost (XALATAN) 0.005 % ophthalmic solution        losartan (COZAAR) 50 MG tablet Take 1 tablet (50 mg) by mouth daily 90 tablet 3     multivitamin, therapeutic (THERA-VIT) TABS Take 1 tablet by mouth daily       simvastatin (ZOCOR) 20 MG tablet Take 1 tablet (20 mg) by mouth At Bedtime 90 tablet 3     No facility-administered encounter medications on file as of 7/28/2021.             O:   NAD, WDWN, Alert & Oriented, Mood & Affect wnl, Vitals stable   Here today alone   /69   Pulse 78   SpO2 95%    General appearance normal   Vitals stable   Alert, oriented and in no acute distress     R neck ulcerated 2cm red plaque  Eyes: Conjunctivae/lids:Normal     ENT: Lips, buccal mucosa, tongue: normal    MSK:Normal    Cardiovascular: peripheral edema none    Pulm: Breathing Normal    Neuro/Psych: Orientation:Alert  and Orientedx3 ; Mood/Affect:normal       A/P:  1. R neck basal cell carcinoma   MOHS:   Location    The rationale for Mohs surgery was discussed with the patient and consent was obtained.  The risks and benefits as well as alternatives to therapy were discussed, in detail.  Specifically, the risks of infection, scarring, bleeding, prolonged wound healing, incomplete removal, allergy to anesthesia, nerve injury and recurrence were addressed.  Indication for Mohs was Location. Prior to the procedure, the treatment site was clearly identified and, if available, confirmed with previous photos and confirmed by the patient   All components of the Universal Protocol/PAUSE rule were completed.  The Mohs surgeon operated in two distinct and integrated capacities as the surgeon and pathologist.      The area was prepped with Betasept.  A rim of normal appearing skin was marked circumferentially around the lesion.  The area was infiltrated with local anesthesia.  The tumor was first debulked to remove all clinically apparent tumor.  An incision following the standard Mohs approach was done and the specimen was oriented,mapped and placed in 1 block(s).  Each specimen was then chromacoded and processed in the Mohs laboratory using standard Mohs technique and submitted for frozen section histology.  Frozen section analysis showed no residual tumor but CLEAR MARGINS.      The tumor was excised using standard Mohs technique in 1 stages(s).  CLEAR MARGINS OBTAINED and Final defect size was 2.7 x 2.5 cm.     We discussed the options for wound management in full with the patient including risks/benefits/ possible outcomes.        REPAIR INT:Because of the size and full thickness nature of the defect, a complex closure was planned. After LEC anesthesia and prep, Burow's triangles were excised in the relaxed skin tension lines. The wound edges were widely undermined by dissection in the subcutaneous plane until adequate tissue mobility  was obtained. Hemostasis was obtained. The wound edges were closed in a layered fashion using Vicryl and Fast Absorbing Plain Gut sutures. Postoperative length was 5 cm.   EBL minimal; complications none; wound care routine.  The patient was discharged in good condition and will return in one week for wound evaluation.  It was a pleasure speaking to Shawn Carty today.  Previous clinic notes and pertinent laboratory tests were reviewed prior to Shawn Carty's visit.  Signs and Symptoms of skin cancer discussed with patient.  Patient encouraged to perform monthly skin exams.  UV precautions reviewed with patient.  Risks of non-melanoma skin cancer discussed with patient   Return to clinic next appt        Again, thank you for allowing me to participate in the care of your patient.        Sincerely,        Shaquille Gauthier MD

## 2021-08-02 ENCOUNTER — OFFICE VISIT (OUTPATIENT)
Dept: DERMATOLOGY | Facility: CLINIC | Age: 86
End: 2021-08-02
Payer: COMMERCIAL

## 2021-08-02 VITALS — SYSTOLIC BLOOD PRESSURE: 130 MMHG | HEART RATE: 77 BPM | DIASTOLIC BLOOD PRESSURE: 77 MMHG | OXYGEN SATURATION: 93 %

## 2021-08-02 DIAGNOSIS — C44.319 BASAL CELL CARCINOMA (BCC) OF LEFT CHEEK: Primary | ICD-10-CM

## 2021-08-02 DIAGNOSIS — C44.311 BASAL CELL CARCINOMA (BCC) OF RIGHT NASAL SIDEWALL: ICD-10-CM

## 2021-08-02 DIAGNOSIS — Z48.01 ENCOUNTER FOR CHANGE OR REMOVAL OF SURGICAL WOUND DRESSING: Primary | ICD-10-CM

## 2021-08-02 PROCEDURE — 17312 MOHS ADDL STAGE: CPT | Performed by: DERMATOLOGY

## 2021-08-02 PROCEDURE — 17311 MOHS 1 STAGE H/N/HF/G: CPT | Mod: 59 | Performed by: DERMATOLOGY

## 2021-08-02 PROCEDURE — 13132 CMPLX RPR F/C/C/M/N/AX/G/H/F: CPT | Mod: 59 | Performed by: DERMATOLOGY

## 2021-08-02 PROCEDURE — 14061 TIS TRNFR E/N/E/L10.1-30SQCM: CPT | Mod: 79 | Performed by: DERMATOLOGY

## 2021-08-02 PROCEDURE — 17311 MOHS 1 STAGE H/N/HF/G: CPT | Mod: 79 | Performed by: DERMATOLOGY

## 2021-08-02 PROCEDURE — 99207 PR NO CHARGE NURSE ONLY: CPT

## 2021-08-02 NOTE — LETTER
8/2/2021         RE: Shawn Carty  [address]  [facility] [address]        Dear Colleague,    Thank you for referring your patient, Shawn Carty, to the [facility]. Please see a copy of my visit note below.    Surgical Office Location :   [facility]  [facility] [place] [address]      Shawn Carty is an extremely pleasant 89 year old year old male patient here today for evaluation and managment of basal cell carcinoma on left cheek and r nsw.  Previus site healing well. Patient has no other skin complaints today.  Remainder of the HPI, Meds, PMH, Allergies, FH, and SH was reviewed in chart.      Past Medical History:   Diagnosis Date     Basal cell carcinoma        Past Surgical History:   Procedure Laterality Date     SURGICAL HISTORY OF -   2008    dupuytrens contracture repair R     SURGICAL HISTORY OF -   2008    dupuytrens contracture repair L        Family History   Problem Relation Age of Onset     Diabetes Mother      Cancer Mother      Neurologic Disorder Mother      Cancer Brother      Cancer - colorectal Brother      Prostate Cancer Brother      Diabetes Brother      Diabetes Brother      Neurologic Disorder Brother      Diabetes Sister        Social History     Socioeconomic History     Marital status:      Spouse name: Not on file     Number of children: Not on file     Years of education: Not on file     Highest education level: Not on file   Occupational History     Not on file   Tobacco Use     Smoking status: Former Smoker     Smokeless tobacco: Never Used   Substance and Sexual Activity     Alcohol use: No     Drug use: No     Sexual activity: Not Currently     Partners: Female   Other Topics Concern     Parent/sibling w/ CABG, MI or angioplasty before 65F 55M? No   Social History Narrative     Not on file     Social Determinants of Health     Financial Resource Strain:      Difficulty of Paying Living  Expenses:    Food Insecurity:      Worried About Running Out of Food in the Last Year:      Ran Out of Food in the Last Year:    Transportation Needs:      Lack of Transportation (Medical):      Lack of Transportation (Non-Medical):    Physical Activity:      Days of Exercise per Week:      Minutes of Exercise per Session:    Stress:      Feeling of Stress :    Social Connections:      Frequency of Communication with Friends and Family:      Frequency of Social Gatherings with Friends and Family:      Attends Amish Services:      Active Member of Clubs or Organizations:      Attends Club or Organization Meetings:      Marital Status:    Intimate Partner Violence:      Fear of Current or Ex-Partner:      Emotionally Abused:      Physically Abused:      Sexually Abused:        Outpatient Encounter Medications as of 8/2/2021   Medication Sig Dispense Refill     aspirin (ASA) 81 MG EC tablet Take 1 tablet (81 mg) by mouth daily 1 tablet 0     cetirizine (ZYRTEC) 10 MG tablet Take 10 mg by mouth daily       diphenhydrAMINE (BENADRYL) 25 MG capsule Take 25 mg by mouth every 6 hours as needed for itching or allergies Takes 6 a day       latanoprost (XALATAN) 0.005 % ophthalmic solution        losartan (COZAAR) 50 MG tablet Take 1 tablet (50 mg) by mouth daily 90 tablet 3     multivitamin, therapeutic (THERA-VIT) TABS Take 1 tablet by mouth daily       simvastatin (ZOCOR) 20 MG tablet Take 1 tablet (20 mg) by mouth At Bedtime 90 tablet 3     No facility-administered encounter medications on file as of 8/2/2021.             O:   NAD, WDWN, Alert & Oriented, Mood & Affect wnl, Vitals stable   Here today alone   /77 (BP Location: Left arm, Patient Position: Sitting, Cuff Size: Adult Regular)   Pulse 77   SpO2 93%    General appearance normal   Vitals stable   Alert, oriented and in no acute distress     L cheek shiny pink pearly papule 7mm scaly papule   R NSW pink pearly papule 7mm scaly papule       Eyes:  Conjunctivae/lids:Normal     ENT: Lips, buccal mucosa, tongue: normal    MSK:Normal    Cardiovascular: peripheral edema none    Pulm: Breathing Normal    Neuro/Psych: Orientation:Alert and Orientedx3 ; Mood/Affect:normal       A/P:  1. R NSW basal cell carcinoma   MOHS:   Location    The rationale for Mohs surgery was discussed with the patient and consent was obtained.  The risks and benefits as well as alternatives to therapy were discussed, in detail.  Specifically, the risks of infection, scarring, bleeding, prolonged wound healing, incomplete removal, allergy to anesthesia, nerve injury and recurrence were addressed.  Indication for Mohs was Location. Prior to the procedure, the treatment site was clearly identified and, if available, confirmed with previous photos and confirmed by the patient   All components of the Universal Protocol/PAUSE rule were completed.  The Mohs surgeon operated in two distinct and integrated capacities as the surgeon and pathologist.      The area was prepped with Betasept.  A rim of normal appearing skin was marked circumferentially around the lesion.  The area was infiltrated with local anesthesia.  The tumor was first debulked to remove all clinically apparent tumor.  An incision following the standard Mohs approach was done and the specimen was oriented,mapped and placed in 2 block(s).  Each specimen was then chromacoded and processed in the Mohs laboratory using standard Mohs technique and submitted for frozen section histology.  Frozen section analysis showed  residual tumor but CLEAR MARGINS.    First stage:Orthokeratosis of epidermis with a proliferation of nests of basaloid cells, with peripheral palisading and a haphazard arrangement in the center extending into the dermis, forming nodules.  The tumor cells have hyperchromatic nuclei. Poor cytoplasm and intercellular bridging.      The tumor was excised using standard Mohs technique in 2 stages(s).  CLEAR MARGINS OBTAINED  and Final defect size was 1.4 x 1.1 cm.     We discussed the options for wound management in full with the patient including risks/benefits/ possible outcomes.        REPAIR WITH BUROW'S FLAP: Because of the Because of the size and full thickness nature of the defect and Because of the proximity to the nasal tip and ala, an advancement flap was planned. After LEC anesthesia and prep, the Burow's triangles were excised. One Burow's triangle was displaced laterally to hide incisions within skin relaxation lines. The advancement flap was raised by dissection in the deep subcutaneous plane. The remaining wound edges were undermined and hemostasis was obtained. The flap was advanced into the defect with care to avoid distortion and was sutured into place in a layered fashion using Vicryl and Fast Absorbing sutures. Postoperative size was 3.6 x 4 cm.  EBL minimal; complications none; wound care routine.  The patient was discharged in good condition and will return in one week for wound evaluation.  2. L cheek basal cell carcinoma   MOHS:   Location    The rationale for Mohs surgery was discussed with the patient and consent was obtained.  The risks and benefits as well as alternatives to therapy were discussed, in detail.  Specifically, the risks of infection, scarring, bleeding, prolonged wound healing, incomplete removal, allergy to anesthesia, nerve injury and recurrence were addressed.  Indication for Mohs was Location. Prior to the procedure, the treatment site was clearly identified and, if available, confirmed with previous photos and confirmed by the patient   All components of the Universal Protocol/PAUSE rule were completed.  The Mohs surgeon operated in two distinct and integrated capacities as the surgeon and pathologist.      The area was prepped with Betasept.  A rim of normal appearing skin was marked circumferentially around the lesion.  The area was infiltrated with local anesthesia.  The tumor was first  debulked to remove all clinically apparent tumor.  An incision following the standard Mohs approach was done and the specimen was oriented,mapped and placed in 1 block(s).  Each specimen was then chromacoded and processed in the Mohs laboratory using standard Mohs technique and submitted for frozen section histology.  Frozen section analysis showed  residual tumor but CLEAR MARGINS.    First stage:Orthokeratosis of epidermis with a proliferation of nests of basaloid cells, with peripheral palisading and a haphazard arrangement in the center extending into the dermis, forming nodules.  The tumor cells have hyperchromatic nuclei. Poor cytoplasm and intercellular bridging.      The tumor was excised using standard Mohs technique in 1 stages(s).  CLEAR MARGINS OBTAINED and Final defect size was 1.1 cm.     We discussed the options for wound management in full with the patient including risks/benefits/ possible outcomes.        REPAIR COMPLEX: Because of the tightness of the surrounding skin and Because of the size and full thickness nature of the defect, a complex closure was planned. After LEC anesthesia and prep, Burow's triangles were excised in the relaxed skin tension lines. The wound edges were widely undermined greater than width of the defect on both sides (1.1 cm) by dissection in the subcutaneous plane until adequate tissue mobility was obtained. Hemostasis was obtained. The wound edges were closed in a layered fashion using Vicryl and Fast Absorbing Plain Gut sutures. Postoperative length was 4 cm.   EBL minimal; complications none; wound care routine.  The patient was discharged in good condition and will return in one week for wound evaluation.  It was a pleasure speaking to Shawn Carty today.  Previous clinic notes and pertinent laboratory tests were reviewed prior to Shawn Carty's visit.  Signs and Symptoms of skin cancer discussed with patient.  Patient encouraged to perform monthly skin  exams.  UV precautions reviewed with patient.  Risks of non-melanoma skin cancer discussed with patient   Return to clinic 6 months        Again, thank you for allowing me to participate in the care of your patient.        Sincerely,        Shaquille Gauthier MD

## 2021-08-02 NOTE — PATIENT INSTRUCTIONS
Sutured Wound Care     [facility]: [phone]    [facility]: [phone]    Left cheek and right nose  ? No strenuous activity for 48 hours. Resume moderate activity in 48 hours. No heavy exercising until you are seen for follow up in one week.     ? Take Tylenol as needed for discomfort.                         ? Do not drink alcoholic beverages for 48 hours.     ? Keep the pressure bandage in place for 24 hours. If the bandage becomes blood tinged or loose, reinforce it with gauze and tape.        (Refer to the reverse side of this page for management of bleeding).    ? Remove pressure bandage in 24 hours     ? Leave the flat bandage in place until your follow up appointment.    ? Keep the bandage dry. Wash around it carefully.    ? If the tape becomes soiled or starts to come off, reinforce it with additional paper tape.    ? Do not smoke for 3 weeks; smoking is detrimental to wound healing.    ? It is normal to have swelling and bruising around the surgical site. The bruising will fade in approximately 10-14 days. Elevate the area to reduce swelling.    ? Numbness, itchiness and sensitivity to temperature changes can occur after surgery and may take up to 18 months to normalize.      POSSIBLE COMPLICATIONS    BLEEDIN. Leave the bandage in place.  2. Use tightly rolled up gauze or a cloth to apply direct pressure over the bandage for 20   minutes.  3. Reapply pressure for an additional 20 minutes if necessary  4. Call the office or go to the nearest emergency room if pressure fails to stop the bleeding.  5. Use additional gauze and tape to maintain pressure once the bleeding has stopped.    PAIN:    1. Post operative pain should slowly get better, never worse.  2. A severe increase in pain may indicate a problem. Call the office if this occurs.    In case of emergency phone:Dr Gauthier [phone]      WOUND CARE INSTRUCTIONS  for  ONE WEEK AFTER SURGERY        1) Leave flat bandage  on your skin for one week after today s bandage change.  2) In one week when you remove the bandage, you may resume your regular skin care routine, including washing with mild soap and water, applying moisturizer, make-up and sunscreen.    3) If there are any open or bleeding areas at the incision/graft site you should begin to cover the area with a bandage daily as follows:    1) Clean and dry the area with plain tap water using a Q-tip or sterile gauze pad.  2) Apply Polysporin or Bacitracin ointment to the open area.  3) Cover the wound with a band-aid or a sterile non-stick gauze pad and micropore paper tape.     SIGNS OF INFECTION  - If you notice any of these signs of infection, call your doctor right away: expanding redness around the wound.  - Yellow or greenish-colored pus or cloudy wound drainage.    - Red streaking spreading from the wound.  - Increased swelling, tenderness, or pain around the wound.   - Fever.    Please remember that yellow and clear drainage from a wound can be normal and related to normal wound healing.  Isolated drainage from a wound without a combination of the above features does not indicate infection.     *Once the bandages are removed, the scar will be red and firm (especially in the lip/chin area). This is normal and will fade in time. It might take 6-12 months for this to happen.     *Massaging the area will help the scar soften and fade quicker. Begin to massage the area one month after the bandages have been removed. To massage apply pressure directly and firmly over the scar with the fingertips and move in a circular motion. Massage the area for a few minutes several times a day. Continue to massage the site for several months.    *Approximately 6-8 weeks after surgery it is not uncommon to see the formation of  tender pimple-like  bump along the scar. This is normal. As the scar continues to mature and the stitches underneath the skin begin to dissolve, this might occur. Do  not pick or squeeze, this will resolve on it s own. Should one break open producing a small amount of drainage, apply Polysporin or Bacitracin ointment a few times a day until the wound is completely healed.    *Numbness in the surgical area is expected. It might take 12-18 months for the feeling to return to normal. During this time sensations of itchiness, tingling and occasional sharp pains might be noted. These feelings are normal and will subside once the nerves have completely healed.     IN CASE OF EMERGENCY: Dr Gauthier [phone]     If you were seen in [place] call: [phone]    If you were seen in [place] call: [phone]

## 2021-08-02 NOTE — PATIENT INSTRUCTIONS

## 2021-08-02 NOTE — PROGRESS NOTES
Pt returned to clinic for post surgery 1 week follow up bandage change. Pt has no complaints, denies pain. Bandage removed from right neck, area cleansed with normal saline. Site is healing and wound edges approximating well. Reapplied new steri strips and paper tape.    Advised to watch for signs/sx of infection; spreading redness, drainage, odor, fever. Call or report promptly to clinic. Pt given written instructions and informed to rtc as needed. Patient verbalized understanding.

## 2021-08-02 NOTE — NURSING NOTE
Chief Complaint   Patient presents with     Derm Problem     MOHS       Vitals:    08/02/21 0742   BP: 130/77   BP Location: Left arm   Patient Position: Sitting   Cuff Size: Adult Regular   Pulse: 77   SpO2: 93%     Wt Readings from Last 1 Encounters:   06/16/21 78.9 kg (174 lb)       Grace Lubin LPN .................8/2/2021

## 2021-08-02 NOTE — PROGRESS NOTES
Shawn Carty is an extremely pleasant 89 year old year old male patient here today for evaluation and managment of basal cell carcinoma on left cheek and r nsw.  Previus site healing well. Patient has no other skin complaints today.  Remainder of the HPI, Meds, PMH, Allergies, FH, and SH was reviewed in chart.      Past Medical History:   Diagnosis Date     Basal cell carcinoma        Past Surgical History:   Procedure Laterality Date     SURGICAL HISTORY OF -   2008    dupuytrens contracture repair R     SURGICAL HISTORY OF -   2008    dupuytrens contracture repair L        Family History   Problem Relation Age of Onset     Diabetes Mother      Cancer Mother      Neurologic Disorder Mother      Cancer Brother      Cancer - colorectal Brother      Prostate Cancer Brother      Diabetes Brother      Diabetes Brother      Neurologic Disorder Brother      Diabetes Sister        Social History     Socioeconomic History     Marital status:      Spouse name: Not on file     Number of children: Not on file     Years of education: Not on file     Highest education level: Not on file   Occupational History     Not on file   Tobacco Use     Smoking status: Former Smoker     Smokeless tobacco: Never Used   Substance and Sexual Activity     Alcohol use: No     Drug use: No     Sexual activity: Not Currently     Partners: Female   Other Topics Concern     Parent/sibling w/ CABG, MI or angioplasty before 65F 55M? No   Social History Narrative     Not on file     Social Determinants of Health     Financial Resource Strain:      Difficulty of Paying Living Expenses:    Food Insecurity:      Worried About Running Out of Food in the Last Year:      Ran Out of Food in the Last Year:    Transportation Needs:      Lack of Transportation (Medical):      Lack of Transportation (Non-Medical):    Physical Activity:      Days of Exercise per Week:      Minutes of Exercise per Session:    Stress:      Feeling of Stress :     Social Connections:      Frequency of Communication with Friends and Family:      Frequency of Social Gatherings with Friends and Family:      Attends Christian Services:      Active Member of Clubs or Organizations:      Attends Club or Organization Meetings:      Marital Status:    Intimate Partner Violence:      Fear of Current or Ex-Partner:      Emotionally Abused:      Physically Abused:      Sexually Abused:        Outpatient Encounter Medications as of 8/2/2021   Medication Sig Dispense Refill     aspirin (ASA) 81 MG EC tablet Take 1 tablet (81 mg) by mouth daily 1 tablet 0     cetirizine (ZYRTEC) 10 MG tablet Take 10 mg by mouth daily       diphenhydrAMINE (BENADRYL) 25 MG capsule Take 25 mg by mouth every 6 hours as needed for itching or allergies Takes 6 a day       latanoprost (XALATAN) 0.005 % ophthalmic solution        losartan (COZAAR) 50 MG tablet Take 1 tablet (50 mg) by mouth daily 90 tablet 3     multivitamin, therapeutic (THERA-VIT) TABS Take 1 tablet by mouth daily       simvastatin (ZOCOR) 20 MG tablet Take 1 tablet (20 mg) by mouth At Bedtime 90 tablet 3     No facility-administered encounter medications on file as of 8/2/2021.             O:   NAD, WDWN, Alert & Oriented, Mood & Affect wnl, Vitals stable   Here today alone   /77 (BP Location: Left arm, Patient Position: Sitting, Cuff Size: Adult Regular)   Pulse 77   SpO2 93%    General appearance normal   Vitals stable   Alert, oriented and in no acute distress     L cheek shiny pink pearly papule 7mm scaly papule   R NSW pink pearly papule 7mm scaly papule       Eyes: Conjunctivae/lids:Normal     ENT: Lips, buccal mucosa, tongue: normal    MSK:Normal    Cardiovascular: peripheral edema none    Pulm: Breathing Normal    Neuro/Psych: Orientation:Alert and Orientedx3 ; Mood/Affect:normal       A/P:  1. R NSW basal cell carcinoma   MOHS:   Location    The rationale for Mohs surgery was discussed with the patient and consent was  obtained.  The risks and benefits as well as alternatives to therapy were discussed, in detail.  Specifically, the risks of infection, scarring, bleeding, prolonged wound healing, incomplete removal, allergy to anesthesia, nerve injury and recurrence were addressed.  Indication for Mohs was Location. Prior to the procedure, the treatment site was clearly identified and, if available, confirmed with previous photos and confirmed by the patient   All components of the Universal Protocol/PAUSE rule were completed.  The Mohs surgeon operated in two distinct and integrated capacities as the surgeon and pathologist.      The area was prepped with Betasept.  A rim of normal appearing skin was marked circumferentially around the lesion.  The area was infiltrated with local anesthesia.  The tumor was first debulked to remove all clinically apparent tumor.  An incision following the standard Mohs approach was done and the specimen was oriented,mapped and placed in 2 block(s).  Each specimen was then chromacoded and processed in the Mohs laboratory using standard Mohs technique and submitted for frozen section histology.  Frozen section analysis showed  residual tumor but CLEAR MARGINS.    First stage:Orthokeratosis of epidermis with a proliferation of nests of basaloid cells, with peripheral palisading and a haphazard arrangement in the center extending into the dermis, forming nodules.  The tumor cells have hyperchromatic nuclei. Poor cytoplasm and intercellular bridging.      The tumor was excised using standard Mohs technique in 2 stages(s).  CLEAR MARGINS OBTAINED and Final defect size was 1.4 x 1.1 cm.     We discussed the options for wound management in full with the patient including risks/benefits/ possible outcomes.        REPAIR WITH BUROW'S FLAP: Because of the Because of the size and full thickness nature of the defect and Because of the proximity to the nasal tip and ala, an advancement flap was planned. After  LEC anesthesia and prep, the Burow's triangles were excised. One Burow's triangle was displaced laterally to hide incisions within skin relaxation lines. The advancement flap was raised by dissection in the deep subcutaneous plane. The remaining wound edges were undermined and hemostasis was obtained. The flap was advanced into the defect with care to avoid distortion and was sutured into place in a layered fashion using Vicryl and Fast Absorbing sutures. Postoperative size was 3.6 x 4 cm.  EBL minimal; complications none; wound care routine.  The patient was discharged in good condition and will return in one week for wound evaluation.  2. L cheek basal cell carcinoma   MOHS:   Location    The rationale for Mohs surgery was discussed with the patient and consent was obtained.  The risks and benefits as well as alternatives to therapy were discussed, in detail.  Specifically, the risks of infection, scarring, bleeding, prolonged wound healing, incomplete removal, allergy to anesthesia, nerve injury and recurrence were addressed.  Indication for Mohs was Location. Prior to the procedure, the treatment site was clearly identified and, if available, confirmed with previous photos and confirmed by the patient   All components of the Universal Protocol/PAUSE rule were completed.  The Mohs surgeon operated in two distinct and integrated capacities as the surgeon and pathologist.      The area was prepped with Betasept.  A rim of normal appearing skin was marked circumferentially around the lesion.  The area was infiltrated with local anesthesia.  The tumor was first debulked to remove all clinically apparent tumor.  An incision following the standard Mohs approach was done and the specimen was oriented,mapped and placed in 1 block(s).  Each specimen was then chromacoded and processed in the Mohs laboratory using standard Mohs technique and submitted for frozen section histology.  Frozen section analysis showed  residual  tumor but CLEAR MARGINS.    First stage:Orthokeratosis of epidermis with a proliferation of nests of basaloid cells, with peripheral palisading and a haphazard arrangement in the center extending into the dermis, forming nodules.  The tumor cells have hyperchromatic nuclei. Poor cytoplasm and intercellular bridging.      The tumor was excised using standard Mohs technique in 1 stages(s).  CLEAR MARGINS OBTAINED and Final defect size was 1.1 cm.     We discussed the options for wound management in full with the patient including risks/benefits/ possible outcomes.        REPAIR COMPLEX: Because of the tightness of the surrounding skin and Because of the size and full thickness nature of the defect, a complex closure was planned. After LEC anesthesia and prep, Burow's triangles were excised in the relaxed skin tension lines. The wound edges were widely undermined greater than width of the defect on both sides (1.1 cm) by dissection in the subcutaneous plane until adequate tissue mobility was obtained. Hemostasis was obtained. The wound edges were closed in a layered fashion using Vicryl and Fast Absorbing Plain Gut sutures. Postoperative length was 4 cm.   EBL minimal; complications none; wound care routine.  The patient was discharged in good condition and will return in one week for wound evaluation.  It was a pleasure speaking to Shawn Carty today.  Previous clinic notes and pertinent laboratory tests were reviewed prior to Shawn Carty's visit.  Signs and Symptoms of skin cancer discussed with patient.  Patient encouraged to perform monthly skin exams.  UV precautions reviewed with patient.  Risks of non-melanoma skin cancer discussed with patient   Return to clinic 6 months

## 2021-08-09 ENCOUNTER — OFFICE VISIT (OUTPATIENT)
Dept: DERMATOLOGY | Facility: CLINIC | Age: 86
End: 2021-08-09
Payer: COMMERCIAL

## 2021-08-09 DIAGNOSIS — Z48.01 ENCOUNTER FOR CHANGE OR REMOVAL OF SURGICAL WOUND DRESSING: Primary | ICD-10-CM

## 2021-08-09 PROCEDURE — 99207 PR NO CHARGE NURSE ONLY: CPT

## 2021-08-09 NOTE — PATIENT INSTRUCTIONS
WOUND CARE INSTRUCTIONS  for  ONE WEEK AFTER SURGERY        Left Cheek & Right Side of Nose    1) Leave flat bandage on your skin for one week after today s bandage change.  2) In one week when you remove the bandage, you may resume your regular skin care routine, including washing with mild soap and water, applying moisturizer, make-up and sunscreen.    3) If there are any open or bleeding areas at the incision/graft site you should begin to cover the area with a bandage daily as follows:    1) Clean and dry the area with plain tap water using a Q-tip or sterile gauze pad.  2) Apply Polysporin or Bacitracin ointment to the open area.  3) Cover the wound with a band-aid or a sterile non-stick gauze pad and micropore paper tape.         SIGNS OF INFECTION  - If you notice any of these signs of infection, call your doctor right away: expanding redness around the wound.  - Yellow or greenish-colored pus or cloudy wound drainage.    - Red streaking spreading from the wound.  - Increased swelling, tenderness, or pain around the wound.   - Fever.    Please remember that yellow and clear drainage from a wound can be normal and related to normal wound healing.  Isolated drainage from a wound without a combination of the above features does not indicate infection.       *Once the bandages are removed, the scar will be red and firm (especially in the lip/chin area). This is normal and will fade in time. It might take 6-12 months for this to happen.     *Massaging the area will help the scar soften and fade quicker. Begin to massage the area one month after the bandages have been removed. To massage apply pressure directly and firmly over the scar with the fingertips and move in a circular motion. Massage the area for a few minutes several times a day. Continue to massage the site for several months.    *Approximately 6-8 weeks after surgery it is not uncommon to see the formation of  tender pimple-like  bump along the scar.  This is normal. As the scar continues to mature and the stitches underneath the skin begin to dissolve, this might occur. Do not pick or squeeze, this will resolve on it s own. Should one break open producing a small amount of drainage, apply Polysporin or Bacitracin ointment a few times a day until the wound is completely healed.    *Numbness in the surgical area is expected. It might take 12-18 months for the feeling to return to normal. During this time sensations of itchiness, tingling and occasional sharp pains might be noted. These feelings are normal and will subside once the nerves have completely healed.         IN CASE OF EMERGENCY: Dr Gauthier [phone]   If you were seen in [place] call: [phone]  If you were seen in [place] call: [phone]

## 2021-08-09 NOTE — PROGRESS NOTES
Pt returned to clinic for post surgery 1 week follow up bandage change. Pt has no complaints, denies pain. Bandage removed from left cheek and right nasal side wall, area cleansed with normal saline. Site is healing and wound edges approximating well. Reapplied new steri strips and paper tape.    Advised to watch for signs/sx of infection; spreading redness, drainage, odor, fever. Call or report promptly to clinic. Pt given written instructions and informed to rtc as needed. Patient verbalized understanding.     Grace Lubin LPN   8/9/2021

## 2021-08-11 RX ORDER — LATANOPROST 50 UG/ML
SOLUTION/ DROPS OPHTHALMIC
OUTPATIENT
Start: 2021-08-11

## 2021-08-12 RX ORDER — LATANOPROST 50 UG/ML
SOLUTION/ DROPS OPHTHALMIC
Status: CANCELLED | OUTPATIENT
Start: 2021-08-12

## 2021-08-12 NOTE — TELEPHONE ENCOUNTER
Spouse Savita states this Rx was denied by the originally prescribing provider, and they recommended the PCP fill it. Please contact if there are further questions/concnerns.    Tiny Bains on 8/12/2021 at 2:25 PM

## 2021-08-13 RX ORDER — LATANOPROST 50 UG/ML
SOLUTION/ DROPS OPHTHALMIC
OUTPATIENT
Start: 2021-08-13

## 2021-08-13 NOTE — TELEPHONE ENCOUNTER
Wife notified, she will call Dr. Baldwin's office and see if he can get enough to cover until his appointment.    Josy Hurst RN

## 2021-08-13 NOTE — TELEPHONE ENCOUNTER
This is an ophthalmologic medication that requires monitoring by ophthalmology.    Has he seen the eye doctor recently? They should be order this.    Maricel Whatley M.D.

## 2021-09-01 ENCOUNTER — TELEPHONE (OUTPATIENT)
Dept: FAMILY MEDICINE | Facility: CLINIC | Age: 86
End: 2021-09-01

## 2021-09-01 DIAGNOSIS — M62.81 MUSCLE WEAKNESS (GENERALIZED): ICD-10-CM

## 2021-09-01 DIAGNOSIS — R26.89 BALANCE PROBLEMS: ICD-10-CM

## 2021-09-01 DIAGNOSIS — R29.898 WEAKNESS OF LOWER EXTREMITY, UNSPECIFIED LATERALITY: ICD-10-CM

## 2021-09-01 DIAGNOSIS — R29.6 FALLS FREQUENTLY: Primary | ICD-10-CM

## 2021-09-01 NOTE — TELEPHONE ENCOUNTER
Reason for Call: Request for an order or referral:    Order or referral being requested: Patient wants physical therapy at home. He can't walk, per wife. They only go to doctor appointments and their daughter drives them.    Date needed: as soon as possible    Has the patient been seen by the PCP for this problem? YES    Phone number Patient can be reached at:  Home number on file [phone] (home)    Best Time:  Any    Can we leave a detailed message on this number?  YES    Call taken on 9/1/2021 at 4:33 PM by Tena Malhotra

## 2021-09-02 ENCOUNTER — TELEPHONE (OUTPATIENT)
Dept: FAMILY MEDICINE | Facility: CLINIC | Age: 86
End: 2021-09-02

## 2021-09-02 ENCOUNTER — PATIENT OUTREACH (OUTPATIENT)
Dept: NURSING | Facility: CLINIC | Age: 86
End: 2021-09-02
Payer: COMMERCIAL

## 2021-09-02 DIAGNOSIS — R29.898 WEAKNESS OF LOWER EXTREMITY, UNSPECIFIED LATERALITY: Primary | ICD-10-CM

## 2021-09-02 DIAGNOSIS — R29.6 FALLS FREQUENTLY: ICD-10-CM

## 2021-09-02 DIAGNOSIS — M62.81 MUSCLE WEAKNESS (GENERALIZED): ICD-10-CM

## 2021-09-02 DIAGNOSIS — R26.89 BALANCE PROBLEMS: ICD-10-CM

## 2021-09-02 NOTE — PROGRESS NOTES
Clinic Care Coordination Contact  Care Team Conversations    [facility] 6/1/21 - Savita (wife) Yissel (daughter)    Order: Patient/caregiver support, resources for support, [facility] unable to take patient on as a client - needs new homecare options.    NABEEL KRISHNAMURTHY made call to [facility] to see why pt was not accepted into care and if they tried to vendor to any other agencies. Staff reported the chronic care team where pt lives is at capacity. Stated they did not try to vendor out to any other agencies and do not plan to work on this any further.     NABEEL KRISHNAMURTHY sent Teams message to CRISTHIAN Toth who was handling this concern is separate encounter. She gave pt's daughter these agencies to call: [facility], [facility], [facility].    NABEEL KRISHNAMURTHY made call to [facility]. They asked pt's information be faxed to see if they can take his insurance and referral. Message sent to clinic.     NABEEL KRISHNAMURTHY made call to Yissel, pt's daughter as that is who RN spoke to earlier. She talked to [facility] who asked that pt's information be faxed over. Yissel already called clinic back and requested this. CC asked that daughter call CC if she hears anything about accepting/declining pt.     Daughter noted that pt has been falling. Pt has PCP OV scheduled for 9/16/21, earliest available/they can make. Worried that pt/wife need help with showers, wife helping but shouldn't be. CC let daughter know that we can discuss more resources as well. Thanked CC for call.     Plan: NABEEL KRISHNAMURTHY will check on status of home care referral tomorrow, or sooner as notified.     Emily Garrett, [facility]   Social Work Clinic Care Coordinator   [facility]  [phone]  [email or web address]

## 2021-09-02 NOTE — TELEPHONE ENCOUNTER
See note below.  Pt/familly asking for Home Care referral.  Pt was last seen on 6/16/21 by .  Last seen by  on 5/20/21 to [facility].  Told wife yesterday would need to wait until appt on 9/16/21 to do Home Care Referral.  Daughter wanting referral done sooner?  Advise.  Felipe

## 2021-09-02 NOTE — TELEPHONE ENCOUNTER
Yissel daughter has called back and stated that pt needs in home care also for Aid,or Nurse for showers and assessment.  Pt cannot transfer from WC to bed and shower.  Should be [facility],  Please let her know of the order going thru. Pt has not shower for a bit.    Yissel [phone]  With any questions.  Shawn wife Savita is with his at home.    Rosa Ahuja  [facility]

## 2021-09-02 NOTE — TELEPHONE ENCOUNTER
Spoke with pts daughter Yissel and she had spoken to [facility].  Faxed med list, last clinic notes, Home Care orders and face sheet to them at #[phone].  Mentioned again that pt hasn't been seen in the last 30 days and this may need to wait until his appt on 9/16/21.  Felipe

## 2021-09-02 NOTE — TELEPHONE ENCOUNTER
Can the clinic please fax the patient demographics, pt insurance information (misc reports - description: financial summary/report), and these home care orders to [facility]? (Fax #[phone])     Can you include my contact information on cover sheet to return call with accept/decline:     ROSALIND Galeano   Social Work Clinic Care Coordinator   [facility]  [phone]  [email or web address]     Thank you!

## 2021-09-02 NOTE — TELEPHONE ENCOUNTER
Spoke with pts daughter Yissel #[phone] that Care Coordination would be calling to help set up Home Care.  Gave her the names and #'s of 3 Home Care Agencies to check on their availability.  If they are open to see pt, to call clinic and Home Care orders can be faxed to them.  KpavelRN

## 2021-09-02 NOTE — TELEPHONE ENCOUNTER
Referral signed for care coordination, can also give additional homecare resources.    Maricel Whatley M.D.

## 2021-09-02 NOTE — TELEPHONE ENCOUNTER
Called [facility] home care and last referral was closed 7/20. Need new order for care

## 2021-09-02 NOTE — TELEPHONE ENCOUNTER
Received call from [facility] that they are unable to take on this pt due to staffing.    Clinic will need to check with other Home Care Agencies for his care.    Give pt names,#'s for other agencies?    Advise.  Felipe

## 2021-09-02 NOTE — TELEPHONE ENCOUNTER
Was there something wrong with the homecare referral placed 6/15/2021?  Can we verbally add on HHA?    Maricel Whatley M.D.

## 2021-09-03 ENCOUNTER — PATIENT OUTREACH (OUTPATIENT)
Dept: NURSING | Facility: CLINIC | Age: 86
End: 2021-09-03
Payer: COMMERCIAL

## 2021-09-03 NOTE — TELEPHONE ENCOUNTER
Can they get him here on 9/9 - can use the same day/hospital follow up (should be 40 minutes) - then we can get this started sooner.    Maricel Whatley M.D.

## 2021-09-03 NOTE — PROGRESS NOTES
Clinic Care Coordination Contact     CC outreach to pt's daughterYissel.     Home care cannot start with patient until after PCP OV. Rescheduled for 9/13/21. Daughter is out of town next week. Unsure pt/wife were comfortable going to clinic on their own using [facility], will wait for daughter return to take pt to appt.     Scheduled initial CC phone assessment for after PCP OV 9/13/21 at 3 pm. Will discuss home care and other resources needed.     ROSALIND Galeano   Social Work Clinic Care Coordinator   [facility]  [phone]  [email or web address]

## 2021-09-03 NOTE — TELEPHONE ENCOUNTER
Dr. Whatley - this patient cannot be seen by [facility] due to no staff.      [facility] can see him but will need to wait until F2F visit on 9/16 and will need a new homecare referral placed at that time.    Josy Hurst RN

## 2021-09-03 NOTE — TELEPHONE ENCOUNTER
Spoke with daughter, she is out of town next week.  Appointment changed, moved to 9/13 BRITTNEY Hurst RN

## 2021-09-13 ENCOUNTER — PATIENT OUTREACH (OUTPATIENT)
Dept: NURSING | Facility: CLINIC | Age: 86
End: 2021-09-13
Payer: COMMERCIAL

## 2021-09-13 ENCOUNTER — OFFICE VISIT (OUTPATIENT)
Dept: FAMILY MEDICINE | Facility: CLINIC | Age: 86
End: 2021-09-13
Payer: COMMERCIAL

## 2021-09-13 VITALS
TEMPERATURE: 98.3 F | SYSTOLIC BLOOD PRESSURE: 140 MMHG | RESPIRATION RATE: 16 BRPM | HEIGHT: 68 IN | BODY MASS INDEX: 26.07 KG/M2 | HEART RATE: 66 BPM | OXYGEN SATURATION: 96 % | WEIGHT: 172 LBS | DIASTOLIC BLOOD PRESSURE: 64 MMHG

## 2021-09-13 DIAGNOSIS — R26.89 BALANCE PROBLEMS: ICD-10-CM

## 2021-09-13 DIAGNOSIS — R29.6 FALLS FREQUENTLY: Primary | ICD-10-CM

## 2021-09-13 DIAGNOSIS — R29.898 WEAKNESS OF BOTH LOWER EXTREMITIES: ICD-10-CM

## 2021-09-13 PROCEDURE — 99213 OFFICE O/P EST LOW 20 MIN: CPT | Performed by: FAMILY MEDICINE

## 2021-09-13 ASSESSMENT — MIFFLIN-ST. JEOR: SCORE: 1399.69

## 2021-09-13 NOTE — PROGRESS NOTES
Assessment & Plan     Falls frequently     - HOME CARE NURSING REFERRAL    Weakness of both lower extremities       Balance problems       Would benefit from more PT, possibly HHA for showers, safety.    Offered MRI of brain to r/o mass lesions, hydrocephalus or stroke (subacute).  After risks, benefits and alternatives discussed they would like to wait.  They understand that if they change their mind on this they can call.     Would likely need some ativan due to claustrophobia.     Documentation of Face to Face and Certification for Home Health Services      I certify that patient, Shawn Carty is under my care and that I, or a Nurse Practitioner or Physician's Assistant working with me, had a face-to-face encounter that meets the physician face-to-face encounter requirements with this patient on: 9/13/2021.      This encounter with the patient was in whole, or in part, for the following medical condition, which is the primary reason for Home Health Care: frequent falls, weakness, balance problems.      I certify that, based on my findings, the following services are medically necessary Home Health Services: Physical Therapy and HHA      My clinical findings support the need for the above services because: Physical Therapy Services are needed to assess and treat the following functional impairments: weakness.      Further, I certify that my clinical findings support that this patient is homebound (i.e. absences from home require considerable and taxing effort and are for medical reasons or [employer] services or infrequently or of short duration when for other reasons) because: Requires assistance of another person or specialized equipment to access medical services because patient: Is unable to operate assistive equipment on their own..      Based on the above findings, I certify that this patient is confined to the home and needs intermittent skilled nursing care, physical therapy and/or speech  "therapy.  The patient is under my care, and I have initiated the establishment of the plan of care.  This patient will be followed by a physician who will periodically review the plan of care.      Physician/Provider to provide follow up care: Maricel Whatley            BMI:   Estimated body mass index is 26.15 kg/m  as calculated from the following:    Height as of this encounter: 1.727 m (5' 8\").    Weight as of this encounter: 78 kg (172 lb).           No follow-ups on file.    Maricel Whatley MD  [facility]    Lavon Escobar is a 89 year old who presents for the following health issues  accompanied by his spouse and daughter:    HPI     Chief Complaint   Patient presents with     Referral     Home Health     Falling more frequently.      Would like to do more home PT for the falls, weakness.     Had one day where he felt his leg give out on him and it was more weak than usual, now that has returned to normal.     He is mostly using a wheelchair at home, does have a walker but usually only goes 10-15 feet with that.     Wife and family worried about him showering.          Review of Systems   Constitutional, HEENT, cardiovascular, pulmonary, gi and gu systems are negative, except as otherwise noted.      Objective    BP (!) 140/64   Pulse 66   Temp 98.3  F (36.8  C) (Tympanic)   Resp 16   Ht 1.727 m (5' 8\")   Wt 78 kg (172 lb)   SpO2 96%   BMI 26.15 kg/m    Body mass index is 26.15 kg/m .  Physical Exam   GENERAL: healthy, alert and no distress  NECK: no adenopathy, no asymmetry, masses, or scars and thyroid normal to palpation  RESP: lungs clear to auscultation - no rales, rhonchi or wheezes  CV: regular rate and rhythm, normal S1 S2, no S3 or S4, no murmur, click or rub, no peripheral edema and peripheral pulses strong  ABDOMEN: soft, nontender, no hepatosplenomegaly, no masses and bowel sounds normal  MS: edema is 2+ bilaterally to mid tibia  NEURO: Normal strength and " tone, mentation intact and speech normal

## 2021-09-13 NOTE — PROGRESS NOTES
Clinic Care Coordination Contact    Pt had PCP OV today at 12:40 pm. See notes.     NABEEL CC [facility]. ([facility] Phone: [phone] Fax: [phone])    NABEEL CC made call to pt's daughter Yissel to discuss any follow up concerns or needs. CC will send letter with information, daughter to call back in future as needed.     ROSALIND Galeano   Social Work Clinic Care Coordinator   [facility]  [phone]  [email or web address]

## 2021-09-13 NOTE — LETTER
[facility] CARE COORDINATION  [facility]  [address] [place] [address]    2021    Shawn Carty  [address]   [facility] [address]      Dear Shawn,    I am a clinic care coordinator who works with Maricel Whatley MD at [facility]. I wanted to thank you for spending the time to talk with me.  Below is a description of clinic care coordination and how I can further assist you.      The clinic care coordination team is made up of a registered nurse,  and community health worker who understand the health care system. The goal of clinic care coordination is to help you manage your health and improve access to the health care system in the most efficient manner. The team can assist you in meeting your health care goals by providing education, coordinating services, strengthening the communication among your providers and supporting you with any resource needs.    Resources:    Private pay staffin) [facility]: [phone]   2) [facility]: [phone]   3) [facility]: [phone]     [facility] Linkage Line: [phone]  Talk about various questions or resources     Please feel free to contact me at [phone] with any questions or concerns. We are focused on providing you with the highest-quality healthcare experience possible and that all starts with you.     Sincerely,     Emily Garrett [facility]   Social Work Clinic Care Coordinator   [facility]  [phone]  [email or web address]

## 2021-09-15 ENCOUNTER — TELEPHONE (OUTPATIENT)
Dept: FAMILY MEDICINE | Facility: CLINIC | Age: 86
End: 2021-09-15

## 2021-09-15 NOTE — TELEPHONE ENCOUNTER
Reason for Call:  Home Health Care    Yulissa with [facility] called regarding (reason for call): Please call with verbal orders    Orders are needed for this patient.     PT: 1x a week x 3weeks    Pt Provider: Chacorta    Phone Number Homecare Nurse can be reached at: [phone]    Can we leave a detailed message on this number? YES    Phone number patient can be reached at: Home number on file [phone] (home)    Best Time: any    Call taken on 9/15/2021 at 3:47 PM by Shira Olson

## 2021-09-15 NOTE — TELEPHONE ENCOUNTER
Reason for Call: Request for an order or referral:    Order or referral being requested:SN, 1x/wk x1; 3 PRN     Date needed: as soon as possible    Has the patient been seen by the PCP for this problem? YES    Phone number Patient can be reached at:  Other phone number:  [facility], [phone]    Best Time:  Any    Can we leave a detailed message on this number?  YES    Call taken on 9/15/2021 at 4:50 PM by Tena Malhotra

## 2021-09-16 ENCOUNTER — TELEPHONE (OUTPATIENT)
Dept: FAMILY MEDICINE | Facility: CLINIC | Age: 86
End: 2021-09-16

## 2021-09-16 NOTE — TELEPHONE ENCOUNTER
[facility] Resuscitation status order completed, signed, faxed back to fax # [phone], copy sent to scanning and copy placed in daily work.    Eli Levin on 9/16/2021 at 2:49 PM

## 2021-09-17 ENCOUNTER — TELEPHONE (OUTPATIENT)
Dept: FAMILY MEDICINE | Facility: CLINIC | Age: 86
End: 2021-09-17

## 2021-09-17 NOTE — TELEPHONE ENCOUNTER
[facility] Plan of care for PT completed, faxed back to [phone], copy sent to scanning and copy placed in daily work.    Eli Levin on 9/17/2021 at 8:20 AM

## 2021-09-21 ENCOUNTER — DOCUMENTATION ONLY (OUTPATIENT)
Dept: OTHER | Facility: CLINIC | Age: 86
End: 2021-09-21

## 2021-09-28 DIAGNOSIS — Z53.9 DIAGNOSIS NOT YET DEFINED: Primary | ICD-10-CM

## 2021-09-28 PROCEDURE — G0180 MD CERTIFICATION HHA PATIENT: HCPCS | Performed by: FAMILY MEDICINE

## 2021-10-08 ENCOUNTER — TELEPHONE (OUTPATIENT)
Dept: FAMILY MEDICINE | Facility: CLINIC | Age: 86
End: 2021-10-08

## 2021-10-08 ENCOUNTER — MEDICAL CORRESPONDENCE (OUTPATIENT)
Dept: HEALTH INFORMATION MANAGEMENT | Facility: CLINIC | Age: 86
End: 2021-10-08

## 2021-10-08 NOTE — TELEPHONE ENCOUNTER
Reason for Call:  Home Health Care    Samara with [facility] called regarding (reason for call): FYI  Discharged from home care with goals met.  Phone Number Homecare Nurse can be reached at: [phone]    Can we leave a detailed message on this number? YES    Phone number patient can be reached at: Home number on file [phone] (home)    Best Time: any    Call taken on 10/8/2021 at 4:10 PM by Jo Nguyen

## 2022-02-17 PROBLEM — T78.40XA ALLERGY, INITIAL ENCOUNTER: Status: ACTIVE | Noted: 2017-09-06

## 2022-03-29 ENCOUNTER — OFFICE VISIT (OUTPATIENT)
Dept: FAMILY MEDICINE | Facility: CLINIC | Age: 87
End: 2022-03-29
Payer: COMMERCIAL

## 2022-03-29 VITALS
OXYGEN SATURATION: 96 % | DIASTOLIC BLOOD PRESSURE: 68 MMHG | WEIGHT: 158 LBS | BODY MASS INDEX: 23.95 KG/M2 | TEMPERATURE: 97 F | HEART RATE: 68 BPM | SYSTOLIC BLOOD PRESSURE: 130 MMHG | RESPIRATION RATE: 18 BRPM | HEIGHT: 68 IN

## 2022-03-29 DIAGNOSIS — R29.6 FALLS FREQUENTLY: Primary | ICD-10-CM

## 2022-03-29 DIAGNOSIS — M62.81 MUSCLE WEAKNESS (GENERALIZED): ICD-10-CM

## 2022-03-29 DIAGNOSIS — R29.898 WEAKNESS OF BOTH LOWER EXTREMITIES: ICD-10-CM

## 2022-03-29 PROCEDURE — 99214 OFFICE O/P EST MOD 30 MIN: CPT | Performed by: FAMILY MEDICINE

## 2022-03-29 ASSESSMENT — PAIN SCALES - GENERAL: PAINLEVEL: NO PAIN (0)

## 2022-03-29 NOTE — PROGRESS NOTES
Assessment & Plan     Falls frequently  Weakness of both lower extremities  Muscle weakness (generalized)  No alarm signs or symptoms from fall from last week.  Is recovering.  Shared decision-making with family about what they would like to do. Lives in independent living and do not want to move into an assisted living, understands risk of falling at home, without anyone there to help.  For now, I have asked for home OT/PT and HHA.  I anticipate they will be discharged from home care after a certain amount, due to improvements, at which point they will follow through with outpatient PT referral.  Qualifies for home care due to homebound, see documentation in HPI  - Home Care Referral  - Physical Therapy Referral    Return if symptoms worsen or fail to improve.    Roberta Cordova MD  [facility]        Lavon Escobar is a 89 year old who presents for the following health issues  accompanied by his spouse and daughter.    Fall    History of Present Illness       Reason for visit:  Falling  Symptom onset:  1-2 weeks ago  Symptoms include:  Falling, weakness in legs  Symptom intensity:  Moderate  Symptom progression:  Improving  Had these symptoms before:  Yes  Has tried/received treatment for these symptoms:  Yes  Previous treatment was successful:  Yes  Prior treatment description:  Physical therapy  What makes it worse:  Frustration  What makes it better:  Sit with legs up    He eats 2-3 servings of fruits and vegetables daily.He consumes 1 sweetened beverage(s) daily.He exercises with enough effort to increase his heart rate 10 to 19 minutes per day.  He exercises with enough effort to increase his heart rate 3 or less days per week.   He is taking medications regularly.       Concern - Patient fell x2  Onset: 3-20-22, 3-21-22  Description: Patient is here due to 2 falls he has had recently, patient  does not have pain from the falls. Patient legs are not very sturdy for him and he  will fall at times.He has fallen x2 in the last month    The first one happened from transferring from la-z-boy into the walker, his  slipped off the handle and he fell onto the floor and he didn't feel like he had any pain and was able to reposition himself but he couldn't get up nor call for his wife so they ended up getting up with help of police, and then the next morning fell again, similar issue while transferring from wheelchair to walker. Fully denies pain right now  Intensity: no pain  Progression of Symptoms:  Same patient was bruise and pain seem to be the same   Accompanying Signs & Symptoms: 1st fall Patient slipped out of his chair tring to get up and 2nd time was switching from walker to wheelchair and was not far enough back, 2nd fall was   Previous history of similar problem: yes   Precipitating factors:        Worsened by: movement   Alleviating factors:        Improved by:   Therapies tried and outcome: Patient has had PT in the past     He was keeping up with dorinda PT exercises that he was instructed but he started to find them harder and harder to do.  He needs assist of 2 in order to get into vehicle at all - his daughter and TENZIN.    Documentation of Face-to-Face and Certification for Home Health Services     Patient: Shawn Carty   YOB: 1928  MR Number: [id number]  Today's Date: 3/29/2022    I certify that patient: Shawn Carty is under my care and that I, or a nurse practitioner or physician's assistant working with me, had a face-to-face encounter that meets the physician face-to-face encounter requirements with this patient on: 3/29/2022.    This encounter with the patient was in whole, or in part, for the following medical condition, which is the primary reason for home health care: Generalized weakness, frequent falling, requires assistance of equipment and people for ambulation, unable to perform ADLs.    I certify that, based on my findings, the following  "services are medically necessary home health services: Occupational Therapy, Physical Therapy and HHA.    My clinical findings support the need for the above services because: Occupational Therapy Services are needed to assess and treat cognitive ability and address ADL safety due to impairment in ADLs., Physical Therapy Services are needed to assess and treat the following functional impairments: ambulation. and needing HHA for support with ADLs    Further, I certify that my clinical findings support that this patient is homebound (i.e. absences from home require considerable and taxing effort and are for medical reasons or [facility] services or infrequently or of short duration when for other reasons) because: Requires assistance of another person or specialized equipment to access medical services because patient: Is unable to walk greater than 100 feet without rest. and Requires supervision of another for safe transfer...    Based on the above findings. I certify that this patient is confined to the home and needs intermittent skilled nursing care, physical therapy and/or speech therapy.  The patient is under my care, and I have initiated the establishment of the plan of care.  This patient will be followed by a physician who will periodically review the plan of care.  Physician/Provider to provide follow up care: Maricel Whatley    Responsible Medicare certified PEC Physician: Roberta Cordova MD  Physician Signature: See electronic signature associated with these discharge orders.  Date: 3/29/2022    Review of Systems   Constitutional, HEENT, cardiovascular, pulmonary, gi and gu systems are negative, except as otherwise noted.      Objective    /68   Pulse 68   Temp 97  F (36.1  C) (Tympanic)   Ht 1.727 m (5' 8\")   Wt 71.7 kg (158 lb)   SpO2 96%   BMI 24.02 kg/m    Body mass index is 24.02 kg/m .  Physical Exam   GENERAL: some global wasting apparent but otherwise healthy, alert and no distress. " Sitting comfortably in wheelchair  RESP: normal respiratory effort, speaking in complete sentences  MS: no gross musculoskeletal defects noted, no edema, ecchymoses. Joints without swelling or defect  NEURO: normal speech. Normal muscle tone but with generalized atrophy  PSYCH: mentation appears normal, affect normal/bright

## 2022-03-30 ENCOUNTER — TELEPHONE (OUTPATIENT)
Dept: FAMILY MEDICINE | Facility: CLINIC | Age: 87
End: 2022-03-30
Payer: COMMERCIAL

## 2022-03-30 NOTE — TELEPHONE ENCOUNTER
Routing to Ordering Provider to approved the nurse evaluate and treatment.     Please see the below information regarding the orders    Amalia Woodward RN BSN PHN

## 2022-03-30 NOTE — TELEPHONE ENCOUNTER
Called and spoke to Tena and gave her the approval for the below requested orders per Dr. Cordova's request.     [phone] ext: [phone]    Amalia Woodward RN BSN PHN

## 2022-03-30 NOTE — TELEPHONE ENCOUNTER
Reason for Call:  Home Health Care    Tena with [facility] called regarding (reason for call): Please call with verbal orders - O to leave message    Orders are needed for this patient.     Skilled Nursing: Eval and treat - Need nursing eval in order to do PT    Pt Provider: Art    Phone Number Homecare Nurse can be reached at: [phone]    Can we leave a detailed message on this number? YES    Phone number patient can be reached at: Home number on file [phone] (home)    Best Time: any    Call taken on 3/30/2022 at 8:13 AM by Shira Olson

## 2022-03-31 ENCOUNTER — TELEPHONE (OUTPATIENT)
Dept: FAMILY MEDICINE | Facility: CLINIC | Age: 87
End: 2022-03-31
Payer: COMMERCIAL

## 2022-03-31 NOTE — TELEPHONE ENCOUNTER
Reason for Call:  Home Health Care    Ava with [facility] called regarding (reason for call): Please call with verbal orders - OK to leave message     Orders are needed for this patient.     PT: Eval & Treat - Due to frequent falls     OT: Eval & Treat    Skilled Nursinx a week x first week and then weekly for 3 weeks.      Pt Provider: Art    Phone Number Homecare Nurse can be reached at: [phone]    Can we leave a detailed message on this number? YES    Phone number patient can be reached at: Home number on file [phone] (home)    Best Time: any    Call taken on 3/31/2022 at 3:20 PM by Shira Olson

## 2022-03-31 NOTE — TELEPHONE ENCOUNTER
RN gave verbal Orders for the below requested orders. Per the Home Care, Assisted Living or Nursing Home Evaluations and treatments (Including After Hours)-[facility] Policy, RN able to give verbal approval.     Routing to Provider for BRITTNEY Woodward RN BSN PHN

## 2022-04-06 DIAGNOSIS — Z53.9 DIAGNOSIS NOT YET DEFINED: Primary | ICD-10-CM

## 2022-04-06 PROCEDURE — G0180 MD CERTIFICATION HHA PATIENT: HCPCS | Performed by: FAMILY MEDICINE

## 2022-04-13 ENCOUNTER — TELEPHONE (OUTPATIENT)
Dept: FAMILY MEDICINE | Facility: CLINIC | Age: 87
End: 2022-04-13
Payer: COMMERCIAL

## 2022-04-13 NOTE — TELEPHONE ENCOUNTER
Reason for Call:  Home Health Care    Dior with [facility] called regarding (reason for call): Please call with verbal orders - OK to leave detailed message    Orders are needed for this patient.     PT: Prevent falls - Once weekly for 6 weeks    Pt Provider: Chacorta    Phone Number Homecare Nurse can be reached at: [phone]    Can we leave a detailed message on this number? YES    Phone number patient can be reached at: Home number on file [phone] (home)    Best Time: any    Call taken on 4/13/2022 at 1:52 PM by Shira Olson

## 2022-04-13 NOTE — TELEPHONE ENCOUNTER
Per the Home Care, Assisted Living or Nursing Home Evaluations and treatments (Including After Hours)-[facility] this RN able to approve the below requested orders for PT.     RN call and left a detailed VM that the below request PT orders have been approved.     Amalia Woodward RN BSN PHN

## 2022-04-14 ENCOUNTER — MEDICAL CORRESPONDENCE (OUTPATIENT)
Dept: HEALTH INFORMATION MANAGEMENT | Facility: CLINIC | Age: 87
End: 2022-04-14
Payer: COMMERCIAL

## 2022-04-18 ENCOUNTER — TELEPHONE (OUTPATIENT)
Dept: FAMILY MEDICINE | Facility: CLINIC | Age: 87
End: 2022-04-18
Payer: COMMERCIAL

## 2022-04-18 NOTE — TELEPHONE ENCOUNTER
Reason for Call: Request for an order or referral:    Order or referral being requested: Verbal Orders:  OP  1 x week for 4 weeks:  For Home safety, fall prevention, and independent w/ADL's    Date needed: as soon as possible    Has the patient been seen by the PCP for this problem? Not Applicable    Additional comments: none    Phone number Patient can be reached at:  Other phone number:  Cierra is at [phone]    Best Time:  any    Can we leave a detailed message on this number?  YES    Call taken on 4/18/2022 at 11:27 AM by Rosa Ahuja

## 2022-04-18 NOTE — TELEPHONE ENCOUNTER
Per the Home Care, Assisted Living or Nursing Home Evaluations and treatments (Including After Hours)-[facility] this RN is able to approve the below requested OT orders.     RN called and spoke to Cierra RASHEED and gave her the verbal okay for the below requested orders.     Amalia Woodward RN BSN PHN

## 2022-04-20 ENCOUNTER — MEDICAL CORRESPONDENCE (OUTPATIENT)
Dept: HEALTH INFORMATION MANAGEMENT | Facility: CLINIC | Age: 87
End: 2022-04-20
Payer: COMMERCIAL

## 2022-05-25 ENCOUNTER — MEDICAL CORRESPONDENCE (OUTPATIENT)
Dept: HEALTH INFORMATION MANAGEMENT | Facility: CLINIC | Age: 87
End: 2022-05-25
Payer: COMMERCIAL

## 2022-09-09 ENCOUNTER — MEDICAL CORRESPONDENCE (OUTPATIENT)
Dept: HEALTH INFORMATION MANAGEMENT | Facility: CLINIC | Age: 87
End: 2022-09-09

## 2022-12-14 ENCOUNTER — TELEPHONE (OUTPATIENT)
Dept: FAMILY MEDICINE | Facility: CLINIC | Age: 87
End: 2022-12-14

## 2022-12-14 NOTE — TELEPHONE ENCOUNTER
Patient Quality Outreach    Patient is due for the following:   Diabetes -  LDL (Fasting) and Microalbumin  Physical Annual Wellness Visit      Topic Date Due     COVID-19 Vaccine (1) Never done     Diptheria Tetanus Pertussis (DTAP/TDAP/TD) Vaccine (1 - Tdap) Never done     Zoster (Shingles) Vaccine (1 of 2) Never done     Pneumococcal Vaccine (1 - PCV) Never done     Flu Vaccine (1) Never done       Next Steps:   Schedule a Annual Wellness Visit    Type of outreach:    Sent letter.    Next Steps:  Reach out within 90 days via Letter.    Max number of attempts reached: No. Will try again in 90 days if patient still on fail list.    Questions for provider review:    None     Josy Mclean MA

## 2022-12-14 NOTE — LETTER
December 14, 2022      Shawn Carty  [address]   [facility] [place] [address]        Dear Shawn,           December 14, 2022    To  Shawn Carty  [address]   [facility] [place] [address]    Your team at [facility] cares about your health. We have reviewed your chart and based on our findings; we are making the following recommendations to better manage your health.     You are in particular need of attention regarding the following:     Schedule a DIABETIC FOLLOW UP appointment for Lab Only Visit. Patients with diabetes should see their provider regularly.  PREVENTATIVE VISIT: Annual Medicare Wellness:Schedule an Annual Medicare Wellness Exam. Please call your [facility] to set up your appointment.    If you have already completed these items, please contact the clinic via phone or   MyChart so your care team can review and update your records. Thank you for   choosing [facility] Clinics for your healthcare needs. For any questions,   concerns, or to schedule an appointment please contact our clinic.    Healthy Regards,      Your [facility] Care Team

## 2022-12-16 ENCOUNTER — TELEPHONE (OUTPATIENT)
Dept: FAMILY MEDICINE | Facility: CLINIC | Age: 87
End: 2022-12-16

## 2022-12-16 NOTE — TELEPHONE ENCOUNTER
Writer called patient's daughter Yissel and instructed her to call the Medical Records Office.    [facility]

## 2022-12-16 NOTE — TELEPHONE ENCOUNTER
Patient's daughter Yissel calling to report her parents are moving into [facility] soon. They will be having a nursing assessment 12/28/22 and she needs latest office visit notes and med list faxed to  [facility] in [place] attn:  Padmini Awan @ Fax: [phone]  Will route to [facility] care team.  Mirian GARCIA RN

## 2023-01-05 ENCOUNTER — OFFICE VISIT (OUTPATIENT)
Dept: FAMILY MEDICINE | Facility: CLINIC | Age: 88
End: 2023-01-05
Payer: COMMERCIAL

## 2023-01-05 VITALS
SYSTOLIC BLOOD PRESSURE: 126 MMHG | OXYGEN SATURATION: 96 % | WEIGHT: 154.6 LBS | DIASTOLIC BLOOD PRESSURE: 66 MMHG | BODY MASS INDEX: 23.43 KG/M2 | TEMPERATURE: 97.5 F | RESPIRATION RATE: 18 BRPM | HEIGHT: 68 IN | HEART RATE: 62 BPM

## 2023-01-05 DIAGNOSIS — M62.81 MUSCLE WEAKNESS (GENERALIZED): ICD-10-CM

## 2023-01-05 DIAGNOSIS — E03.8 SUBCLINICAL HYPOTHYROIDISM: ICD-10-CM

## 2023-01-05 DIAGNOSIS — E78.2 MIXED HYPERLIPIDEMIA: ICD-10-CM

## 2023-01-05 DIAGNOSIS — R73.01 IMPAIRED FASTING GLUCOSE: ICD-10-CM

## 2023-01-05 DIAGNOSIS — I10 HYPERTENSION GOAL BP (BLOOD PRESSURE) < 140/90: ICD-10-CM

## 2023-01-05 DIAGNOSIS — N18.31 STAGE 3A CHRONIC KIDNEY DISEASE (H): ICD-10-CM

## 2023-01-05 DIAGNOSIS — Z00.00 ENCOUNTER FOR MEDICARE ANNUAL WELLNESS EXAM: Primary | ICD-10-CM

## 2023-01-05 PROBLEM — I48.91 NEW ONSET ATRIAL FIBRILLATION (H): Status: RESOLVED | Noted: 2021-05-20 | Resolved: 2023-01-05

## 2023-01-05 LAB
ANION GAP SERPL CALCULATED.3IONS-SCNC: 6 MMOL/L (ref 7–15)
BUN SERPL-MCNC: 15.4 MG/DL (ref 8–23)
CALCIUM SERPL-MCNC: 9.6 MG/DL (ref 8.2–9.6)
CHLORIDE SERPL-SCNC: 108 MMOL/L (ref 98–107)
CHOLEST SERPL-MCNC: 110 MG/DL
CREAT SERPL-MCNC: 1.28 MG/DL (ref 0.67–1.17)
DEPRECATED HCO3 PLAS-SCNC: 28 MMOL/L (ref 22–29)
GFR SERPL CREATININE-BSD FRML MDRD: 52 ML/MIN/1.73M2
GLUCOSE SERPL-MCNC: 139 MG/DL (ref 70–99)
HBA1C MFR BLD: 6 % (ref 0–5.6)
HDLC SERPL-MCNC: 61 MG/DL
HGB BLD-MCNC: 14.3 G/DL (ref 13.3–17.7)
LDLC SERPL CALC-MCNC: 31 MG/DL
NONHDLC SERPL-MCNC: 49 MG/DL
POTASSIUM SERPL-SCNC: 4.3 MMOL/L (ref 3.4–5.3)
SODIUM SERPL-SCNC: 142 MMOL/L (ref 136–145)
T4 FREE SERPL-MCNC: 1.2 NG/DL (ref 0.9–1.7)
TRIGL SERPL-MCNC: 90 MG/DL
TSH SERPL DL<=0.005 MIU/L-ACNC: 4.34 UIU/ML (ref 0.3–4.2)
VIT B12 SERPL-MCNC: 878 PG/ML (ref 232–1245)

## 2023-01-05 PROCEDURE — 84439 ASSAY OF FREE THYROXINE: CPT | Performed by: NURSE PRACTITIONER

## 2023-01-05 PROCEDURE — 36415 COLL VENOUS BLD VENIPUNCTURE: CPT | Performed by: NURSE PRACTITIONER

## 2023-01-05 PROCEDURE — 80061 LIPID PANEL: CPT | Performed by: NURSE PRACTITIONER

## 2023-01-05 PROCEDURE — 80048 BASIC METABOLIC PNL TOTAL CA: CPT | Performed by: NURSE PRACTITIONER

## 2023-01-05 PROCEDURE — G0439 PPPS, SUBSEQ VISIT: HCPCS | Performed by: NURSE PRACTITIONER

## 2023-01-05 PROCEDURE — 83036 HEMOGLOBIN GLYCOSYLATED A1C: CPT | Performed by: NURSE PRACTITIONER

## 2023-01-05 PROCEDURE — 85018 HEMOGLOBIN: CPT | Performed by: NURSE PRACTITIONER

## 2023-01-05 PROCEDURE — 99213 OFFICE O/P EST LOW 20 MIN: CPT | Mod: 25 | Performed by: NURSE PRACTITIONER

## 2023-01-05 PROCEDURE — 82607 VITAMIN B-12: CPT | Performed by: NURSE PRACTITIONER

## 2023-01-05 PROCEDURE — 84443 ASSAY THYROID STIM HORMONE: CPT | Performed by: NURSE PRACTITIONER

## 2023-01-05 RX ORDER — SIMVASTATIN 20 MG
20 TABLET ORAL AT BEDTIME
Qty: 90 TABLET | Refills: 3 | Status: SHIPPED | OUTPATIENT
Start: 2023-01-05

## 2023-01-05 RX ORDER — LOSARTAN POTASSIUM 50 MG/1
50 TABLET ORAL DAILY
Qty: 90 TABLET | Refills: 3 | Status: SHIPPED | OUTPATIENT
Start: 2023-01-05

## 2023-01-05 ASSESSMENT — ENCOUNTER SYMPTOMS
SORE THROAT: 0
DIZZINESS: 0
PALPITATIONS: 0
HEARTBURN: 0
NAUSEA: 0
CONSTIPATION: 0
COUGH: 0
CHILLS: 0
JOINT SWELLING: 0
HEMATURIA: 0
HEADACHES: 0
FEVER: 0
EYE PAIN: 0
FREQUENCY: 0
WEAKNESS: 0
PARESTHESIAS: 0
DIARRHEA: 0
HEMATOCHEZIA: 0
SHORTNESS OF BREATH: 0
ARTHRALGIAS: 0
NERVOUS/ANXIOUS: 0
DYSURIA: 0
MYALGIAS: 0
ABDOMINAL PAIN: 0

## 2023-01-05 ASSESSMENT — ACTIVITIES OF DAILY LIVING (ADL)
CURRENT_FUNCTION: BATHING REQUIRES ASSISTANCE
CURRENT_FUNCTION: PREPARING MEALS REQUIRES ASSISTANCE
CURRENT_FUNCTION: TRANSPORTATION REQUIRES ASSISTANCE
CURRENT_FUNCTION: HOUSEWORK REQUIRES ASSISTANCE
CURRENT_FUNCTION: LAUNDRY REQUIRES ASSISTANCE
CURRENT_FUNCTION: SHOPPING REQUIRES ASSISTANCE

## 2023-01-05 NOTE — PATIENT INSTRUCTIONS
Patient Education   Personalized Prevention Plan  You are due for the preventive services outlined below.  Your care team is available to assist you in scheduling these services.  If you have already completed any of these items, please share that information with your care team to update in your medical record.  Health Maintenance Due   Topic Date Due     Kidney Microalbumin Urine Test  Never done     COVID-19 Vaccine (1) Never done     Urine Test  Never done     Diptheria Tetanus Pertussis (DTAP/TDAP/TD) Vaccine (1 - Tdap) Never done     Zoster (Shingles) Vaccine (1 of 2) Never done     Pneumococcal Vaccine (1 - PCV) Never done     Annual Wellness Visit  04/02/2020     Hemoglobin  04/26/2021     Cholesterol Lab  05/20/2022     Basic Metabolic Panel  06/16/2022     Flu Vaccine (1) Never done     ANNUAL REVIEW OF HM ORDERS  09/13/2022     Activities of Daily Living    Your Health Risk Assessment indicates you have difficulties with activities of daily living such as housework, bathing, preparing meals, taking medication, etc. Please make a follow up appointment for us to address this issue in more detail.    Signs of Hearing Loss      Hearing much better with one ear can be a sign of hearing loss.   Hearing loss is a problem shared by many people. In fact, it is one of the most common health problems, particularly as people age. Most people age 65 and older have some hearing loss. By age 80, almost everyone does. Hearing loss often occurs slowly over the years. So you may not realize your hearing has gotten worse.  Have your hearing checked  Call your healthcare provider if you:    Have to strain to hear normal conversation    Have to watch other people s faces very carefully to follow what they re saying    Need to ask people to repeat what they ve said    Often misunderstand what people are saying    Turn the volume of the television or radio up so high that others complain    Feel that people are mumbling when  they re talking to you    Find that the effort to hear leaves you feeling tired and irritated    Notice, when using the phone, that you hear better with one ear than the other  [employer] last reviewed this educational content on 1/1/2020 2000-2021 [employer]. All rights reserved. This information is not intended as a substitute for professional medical care. Always follow your healthcare professional's instructions.

## 2023-01-05 NOTE — PROGRESS NOTES
The patient reports that he has difficulty with activities of daily living. Recently moved into assisted living.  The patient was provided with written information regarding signs of hearing loss.

## 2023-01-05 NOTE — PROGRESS NOTES
"SUBJECTIVE:   Shawn is a 89 year old who presents for Preventive Visit.  Patient has been advised of split billing requirements and indicates understanding: Yes  Are you in the first 12 months of your Medicare coverage?  No    Healthy Habits:     In general, how would you rate your overall health?  Good    Frequency of exercise:  6-7 days/week    Duration of exercise:  15-30 minutes    Do you usually eat at least 4 servings of fruit and vegetables a day, include whole grains    & fiber and avoid regularly eating high fat or \"junk\" foods?  Yes    Taking medications regularly:  Yes    Medication side effects:  None    Ability to successfully perform activities of daily living:  Transportation requires assistance, shopping requires assistance, preparing meals requires assistance, housework requires assistance, bathing requires assistance and laundry requires assistance    Home Safety:  No safety concerns identified    Hearing Impairment:  Difficulty following a conversation in a noisy restaurant or crowded room, feel that people are mumbling or not speaking clearly, need to ask people to speak up or repeat themselves, difficulty understanding soft or whispered speech and difficulty understanding speech on the telephone    In the past 6 months, have you been bothered by leaking of urine?  No    In general, how would you rate your overall mental or emotional health?  Good      PHQ-2 Total Score: 0    Additional concerns today:  No      Have you ever done Advance Care Planning? (For example, a Health Directive, POLST, or a discussion with a medical provider or your loved ones about your wishes): Yes, patient states has an Advance Care Planning document and will bring a copy to the clinic.       Fall risk  Fallen 2 or more times in the past year?: No  Any fall with injury in the past year?: No    Cognitive Screening   1) Repeat 3 items (Leader, Season, Table)    2) Clock draw: NORMAL  3) 3 item recall: Recalls 2 objects "   Results: NORMAL clock, 1-2 items recalled: COGNITIVE IMPAIRMENT LESS LIKELY    Mini-CogTM Copyright DORA Álvarez. Licensed by the author for use in [facility]; reprinted with permission (suzi@[facility]). All rights reserved.      Do you have sleep apnea, excessive snoring or daytime drowsiness?: no    Reviewed and updated as needed this visit by clinical staff   Tobacco  Allergies  Meds              Reviewed and updated as needed this visit by Provider                 Social History     Tobacco Use     Smoking status: Former     Smokeless tobacco: Never   Substance Use Topics     Alcohol use: No     If you drink alcohol do you typically have >3 drinks per day or >7 drinks per week? Not applicable    Alcohol Use 1/5/2023   Prescreen: >3 drinks/day or >7 drinks/week? Not Applicable   Prescreen: >3 drinks/day or >7 drinks/week? -       Hyperlipidemia Follow-Up      Are you regularly taking any medication or supplement to lower your cholesterol?   Yes- simvastatin    Are you having muscle aches or other side effects that you think could be caused by your cholesterol lowering medication?  No    Hypertension Follow-up      Do you check your blood pressure regularly outside of the clinic? No     Are you following a low salt diet? Yes    Are your blood pressures ever more than 140 on the top number (systolic) OR more   than 90 on the bottom number (diastolic), for example 140/90? No      Current providers sharing in care for this patient include:   Patient Care Team:  Maricel Whatley MD as PCP - General (Family Medicine)  Shaquille Gauthier MD as Assigned Surgical Provider  Maricel Whatley MD as Assigned PCP    The following health maintenance items are reviewed in Epic and correct as of today:  Health Maintenance   Topic Date Due     MICROALBUMIN  Never done     COVID-19 Vaccine (1) Never done     URINALYSIS  Never done     DTAP/TDAP/TD IMMUNIZATION (1 - Tdap) Never done     ZOSTER IMMUNIZATION (1 of 2)  Never done     Pneumococcal Vaccine: 65+ Years (1 - PCV) Never done     MEDICARE ANNUAL WELLNESS VISIT  04/02/2020     HEMOGLOBIN  04/26/2021     LIPID  05/20/2022     BMP  06/16/2022     INFLUENZA VACCINE (1) Never done     ANNUAL REVIEW OF HM ORDERS  09/13/2022     FALL RISK ASSESSMENT  01/05/2024     ADVANCE CARE PLANNING  09/21/2026     PHQ-2 (once per calendar year)  Completed     IPV IMMUNIZATION  Aged Out     MENINGITIS IMMUNIZATION  Aged Out     BP Readings from Last 3 Encounters:   01/05/23 126/66   03/29/22 130/68   09/13/21 (!) 140/64    Wt Readings from Last 3 Encounters:   01/05/23 70.1 kg (154 lb 9.6 oz)   03/29/22 71.7 kg (158 lb)   09/13/21 78 kg (172 lb)                  Patient Active Problem List   Diagnosis     Hypertension goal BP (blood pressure) < 140/90     Mixed hyperlipidemia     Allergic state, initial encounter     Falls frequently     Chronic kidney disease, stage 3 (H)     Post-nasal drainage     Chronic rhinitis     Weakness of both lower extremities     Past Surgical History:   Procedure Laterality Date     SURGICAL HISTORY OF -   2008    dupuytrens contracture repair R     SURGICAL HISTORY OF -   2008    dupuytrens contracture repair L       Social History     Tobacco Use     Smoking status: Former     Smokeless tobacco: Never   Substance Use Topics     Alcohol use: No     Family History   Problem Relation Age of Onset     Diabetes Mother      Cancer Mother      Neurologic Disorder Mother      Cancer Brother      Cancer - colorectal Brother      Prostate Cancer Brother      Diabetes Brother      Diabetes Brother      Neurologic Disorder Brother      Diabetes Sister          Current Outpatient Medications   Medication Sig Dispense Refill     cetirizine (ZYRTEC) 10 MG tablet Take 10 mg by mouth daily       latanoprost (XALATAN) 0.005 % ophthalmic solution        losartan (COZAAR) 50 MG tablet Take 1 tablet (50 mg) by mouth daily 90 tablet 3     multivitamin, therapeutic (THERA-VIT)  "TABS Take 1 tablet by mouth daily       simvastatin (ZOCOR) 20 MG tablet Take 1 tablet (20 mg) by mouth At Bedtime 90 tablet 3     aspirin (ASA) 81 MG EC tablet Take 1 tablet (81 mg) by mouth daily (Patient not taking: Reported on 3/29/2022) 1 tablet 0             Review of Systems   Constitutional: Negative for chills and fever.   HENT: Positive for hearing loss. Negative for congestion, ear pain and sore throat.    Eyes: Negative for pain and visual disturbance.   Respiratory: Negative for cough and shortness of breath.    Cardiovascular: Negative for chest pain, palpitations and peripheral edema.   Gastrointestinal: Negative for abdominal pain, constipation, diarrhea, heartburn, hematochezia and nausea.   Genitourinary: Negative for dysuria, frequency, genital sores, hematuria, impotence, penile discharge and urgency.   Musculoskeletal: Negative for arthralgias, joint swelling and myalgias.   Skin: Negative for rash.   Neurological: Negative for dizziness, weakness, headaches and paresthesias.   Psychiatric/Behavioral: Negative for mood changes. The patient is not nervous/anxious.          OBJECTIVE:   /66   Pulse 62   Temp 97.5  F (36.4  C) (Tympanic)   Resp 18   Ht 1.727 m (5' 8\")   Wt 70.1 kg (154 lb 9.6 oz)   SpO2 96%   BMI 23.51 kg/m   Estimated body mass index is 23.51 kg/m  as calculated from the following:    Height as of this encounter: 1.727 m (5' 8\").    Weight as of this encounter: 70.1 kg (154 lb 9.6 oz).  Physical Exam  GENERAL: alert and no distress  EYES: Eyes grossly normal to inspection and conjunctivae and sclerae normal  NECK: no adenopathy, no asymmetry, masses, or scars and thyroid normal to palpation  RESP: lungs clear to auscultation - no rales, rhonchi or wheezes  CV: regular rates and rhythm, normal S1 S2, no S3 or S4, no murmur, click or rub and no peripheral edema  MS: no gross musculoskeletal defects noted, no edema  NEURO: weakness of BLE, decreased tone BLE and " mentation intact  PSYCH: mentation appears normal, affect normal/bright    Diagnostic Test Results:  Labs reviewed in Epic    ASSESSMENT / PLAN:   Shawn was seen today for medicare visit.    Diagnoses and all orders for this visit:    Encounter for Medicare annual wellness exam    Stage 3a chronic kidney disease (H)  -     Hemoglobin; Future  -     BASIC METABOLIC PANEL; Future  -     Hemoglobin  -     BASIC METABOLIC PANEL  - monitoring, has been stable    Mixed hyperlipidemia  -     Lipid panel reflex to direct LDL Non-fasting; Future  -     simvastatin (ZOCOR) 20 MG tablet; Take 1 tablet (20 mg) by mouth At Bedtime  -     Lipid panel reflex to direct LDL Non-fasting    Hypertension goal BP (blood pressure) < 140/90  -     losartan (COZAAR) 50 MG tablet; Take 1 tablet (50 mg) by mouth daily    Impaired fasting glucose  -     Hemoglobin A1c; Future  -     Hemoglobin A1c    Muscle weakness (generalized)  -     Vitamin B12; Future  -     Vitamin B12    Subclinical hypothyroidism  -     TSH with free T4 reflex; Future  -     TSH with free T4 reflex    Other orders  -     REVIEW OF HEALTH MAINTENANCE PROTOCOL ORDERS        COUNSELING:  Reviewed preventive health counseling, as reflected in patient instructions        He reports that he has quit smoking. He has never used smokeless tobacco.      Appropriate preventive services were discussed with this patient, including applicable screening as appropriate for cardiovascular disease, diabetes, osteopenia/osteoporosis, and glaucoma.  As appropriate for age/gender, discussed screening for colorectal cancer, prostate cancer, breast cancer, and cervical cancer. Checklist reviewing preventive services available has been given to the patient.    Reviewed patients plan of care and provided an AVS. The Basic Care Plan (routine screening as documented in Health Maintenance) for Shawn meets the Care Plan requirement. This Care Plan has been established and reviewed with the  Patient and daughter.          ADALID Fitch CNP  [facility]    Identified Health Risks:

## 2023-01-05 NOTE — LETTER
January 6, 2023      Shawn Carty  [address]   [facility] [address]        Dear ,    We are writing to inform you of your test results.    Thyroid function is stable. Cholesterol levels are good. Kidney function is stable. Blood sugar is running a little high, but not high enough to be classified as diabetes, we will keep an eye on it and recheck in 1 year.        Resulted Orders   Hemoglobin   Result Value Ref Range    Hemoglobin 14.3 13.3 - 17.7 g/dL   Lipid panel reflex to direct LDL Non-fasting   Result Value Ref Range    Cholesterol 110 <200 mg/dL    Triglycerides 90 <150 mg/dL    Direct Measure HDL 61 >=40 mg/dL    LDL Cholesterol Calculated 31 <=100 mg/dL    Non HDL Cholesterol 49 <130 mg/dL    Narrative    Cholesterol  Desirable:  <200 mg/dL    Triglycerides  Normal:  Less than 150 mg/dL  Borderline High:  150-199 mg/dL  High:  200-499 mg/dL  Very High:  Greater than or equal to 500 mg/dL    Direct Measure HDL  Female:  Greater than or equal to 50 mg/dL   Male:  Greater than or equal to 40 mg/dL    LDL Cholesterol  Desirable:  <100mg/dL  Above Desirable:  100-129 mg/dL   Borderline High:  130-159 mg/dL   High:  160-189 mg/dL   Very High:  >= 190 mg/dL    Non HDL Cholesterol  Desirable:  130 mg/dL  Above Desirable:  130-159 mg/dL  Borderline High:  160-189 mg/dL  High:  190-219 mg/dL  Very High:  Greater than or equal to 220 mg/dL   BASIC METABOLIC PANEL   Result Value Ref Range    Sodium 142 136 - 145 mmol/L    Potassium 4.3 3.4 - 5.3 mmol/L    Chloride 108 (H) 98 - 107 mmol/L    Carbon Dioxide (CO2) 28 22 - 29 mmol/L    Anion Gap 6 (L) 7 - 15 mmol/L    Urea Nitrogen 15.4 8.0 - 23.0 mg/dL    Creatinine 1.28 (H) 0.67 - 1.17 mg/dL    Calcium 9.6 8.2 - 9.6 mg/dL    Glucose 139 (H) 70 - 99 mg/dL    GFR Estimate 52 (L) >60 mL/min/1.73m2      Comment:      Effective December 21, 2021 eGFRcr in adults is calculated using the 2021 CKD-EPI creatinine equation which includes age and  gender (Elie et al., NEJ, DOI: 10.1056/FGSGyx8057637)   TSH with free T4 reflex   Result Value Ref Range    TSH 4.34 (H) 0.30 - 4.20 uIU/mL   Hemoglobin A1c   Result Value Ref Range    Hemoglobin A1C 6.0 (H) 0.0 - 5.6 %      Comment:      Normal <5.7%   Prediabetes 5.7-6.4%    Diabetes 6.5% or higher     Note: Adopted from ADA consensus guidelines.   Vitamin B12   Result Value Ref Range    Vitamin B12 878 232 - 1,245 pg/mL   T4 free   Result Value Ref Range    Free T4 1.20 0.90 - 1.70 ng/dL       If you have any questions or concerns, please call the clinic at the number listed above.       Sincerely,      ADALID Fitch CNP

## 2023-01-06 NOTE — RESULT ENCOUNTER NOTE
Please send patient letter notifying of labs and provider message.    Thyroid function is stable. Cholesterol levels are good. Kidney function is stable. Blood sugar is running a little high, but not high enough to be classified as diabetes, we will keep an eye on it and recheck in 1 year.    Thanks,  ADALID Gonzalez CNP

## 2024-05-02 ENCOUNTER — LAB REQUISITION (OUTPATIENT)
Dept: LAB | Facility: CLINIC | Age: 89
End: 2024-05-02
Payer: COMMERCIAL

## 2024-05-02 DIAGNOSIS — G45.9 TRANSIENT CEREBRAL ISCHEMIC ATTACK, UNSPECIFIED: ICD-10-CM

## 2024-05-02 DIAGNOSIS — I10 ESSENTIAL (PRIMARY) HYPERTENSION: ICD-10-CM

## 2024-05-03 PROCEDURE — P9604 ONE-WAY ALLOW PRORATED TRIP: HCPCS | Mod: ORL | Performed by: NURSE PRACTITIONER

## 2024-05-03 PROCEDURE — 36415 COLL VENOUS BLD VENIPUNCTURE: CPT | Mod: ORL | Performed by: NURSE PRACTITIONER

## 2024-05-03 PROCEDURE — 80053 COMPREHEN METABOLIC PANEL: CPT | Mod: ORL | Performed by: NURSE PRACTITIONER

## 2024-05-04 LAB
ALBUMIN SERPL BCG-MCNC: 3.9 G/DL (ref 3.5–5.2)
ALP SERPL-CCNC: 77 U/L (ref 40–150)
ALT SERPL W P-5'-P-CCNC: 24 U/L (ref 0–70)
ANION GAP SERPL CALCULATED.3IONS-SCNC: 11 MMOL/L (ref 7–15)
AST SERPL W P-5'-P-CCNC: 29 U/L (ref 0–45)
BILIRUB SERPL-MCNC: 1.1 MG/DL
BUN SERPL-MCNC: 15.5 MG/DL (ref 8–23)
CALCIUM SERPL-MCNC: 9.5 MG/DL (ref 8.2–9.6)
CHLORIDE SERPL-SCNC: 106 MMOL/L (ref 98–107)
CREAT SERPL-MCNC: 1.35 MG/DL (ref 0.67–1.17)
DEPRECATED HCO3 PLAS-SCNC: 23 MMOL/L (ref 22–29)
EGFRCR SERPLBLD CKD-EPI 2021: 48 ML/MIN/1.73M2
GLUCOSE SERPL-MCNC: 123 MG/DL (ref 70–99)
POTASSIUM SERPL-SCNC: 4.1 MMOL/L (ref 3.4–5.3)
PROT SERPL-MCNC: 6.7 G/DL (ref 6.4–8.3)
SODIUM SERPL-SCNC: 140 MMOL/L (ref 135–145)